# Patient Record
Sex: FEMALE | Race: WHITE | NOT HISPANIC OR LATINO | URBAN - METROPOLITAN AREA
[De-identification: names, ages, dates, MRNs, and addresses within clinical notes are randomized per-mention and may not be internally consistent; named-entity substitution may affect disease eponyms.]

---

## 2023-04-17 ENCOUNTER — LAB (OUTPATIENT)
Dept: LAB | Facility: LAB | Age: 23
End: 2023-04-17
Payer: COMMERCIAL

## 2023-04-17 ENCOUNTER — OFFICE VISIT (OUTPATIENT)
Dept: PRIMARY CARE | Facility: CLINIC | Age: 23
End: 2023-04-17
Payer: COMMERCIAL

## 2023-04-17 VITALS
WEIGHT: 164 LBS | HEART RATE: 74 BPM | BODY MASS INDEX: 24.86 KG/M2 | DIASTOLIC BLOOD PRESSURE: 88 MMHG | SYSTOLIC BLOOD PRESSURE: 127 MMHG | TEMPERATURE: 97.8 F | OXYGEN SATURATION: 97 % | HEIGHT: 68 IN

## 2023-04-17 DIAGNOSIS — F90.0 ATTENTION DEFICIT HYPERACTIVITY DISORDER (ADHD), PREDOMINANTLY INATTENTIVE TYPE: ICD-10-CM

## 2023-04-17 DIAGNOSIS — G44.209 TENSION HEADACHE: ICD-10-CM

## 2023-04-17 DIAGNOSIS — N92.6 IRREGULAR PERIODS/MENSTRUAL CYCLES: ICD-10-CM

## 2023-04-17 DIAGNOSIS — G43.109 MIGRAINE WITH AURA AND WITHOUT STATUS MIGRAINOSUS, NOT INTRACTABLE: ICD-10-CM

## 2023-04-17 DIAGNOSIS — F41.8 DEPRESSION WITH ANXIETY: Primary | ICD-10-CM

## 2023-04-17 DIAGNOSIS — R41.840 LACK OF CONCENTRATION: ICD-10-CM

## 2023-04-17 PROBLEM — G44.029 CHRONIC CLUSTER HEADACHE, NOT INTRACTABLE: Status: ACTIVE | Noted: 2018-07-11

## 2023-04-17 PROBLEM — H52.03 HYPERMETROPIA OF BOTH EYES: Status: ACTIVE | Noted: 2018-07-11

## 2023-04-17 PROBLEM — L70.9 ACNE: Status: ACTIVE | Noted: 2023-04-17

## 2023-04-17 PROBLEM — R53.83 FATIGUE: Status: ACTIVE | Noted: 2023-04-17

## 2023-04-17 PROBLEM — R55 PRE-SYNCOPE: Status: RESOLVED | Noted: 2023-04-17 | Resolved: 2023-04-17

## 2023-04-17 PROBLEM — L70.9 ACNE: Status: RESOLVED | Noted: 2023-04-17 | Resolved: 2023-04-17

## 2023-04-17 PROBLEM — G93.40 ENCEPHALOPATHY: Status: ACTIVE | Noted: 2023-04-17

## 2023-04-17 PROBLEM — B37.31 VAGINAL CANDIDIASIS: Status: ACTIVE | Noted: 2023-04-17

## 2023-04-17 PROBLEM — R56.9 OBSERVED SEIZURE-LIKE ACTIVITY (MULTI): Status: RESOLVED | Noted: 2023-04-17 | Resolved: 2023-04-17

## 2023-04-17 PROBLEM — R42 DIZZINESS: Status: ACTIVE | Noted: 2023-04-17

## 2023-04-17 PROBLEM — F41.9 ANXIETY: Status: ACTIVE | Noted: 2023-04-17

## 2023-04-17 PROBLEM — R55 PRE-SYNCOPE: Status: ACTIVE | Noted: 2023-04-17

## 2023-04-17 PROBLEM — R79.89 ABNORMAL THYROID BLOOD TEST: Status: ACTIVE | Noted: 2023-04-17

## 2023-04-17 PROBLEM — R53.83 FATIGUE: Status: RESOLVED | Noted: 2023-04-17 | Resolved: 2023-04-17

## 2023-04-17 PROBLEM — R53.1 GENERALIZED WEAKNESS: Status: ACTIVE | Noted: 2023-04-17

## 2023-04-17 PROBLEM — R42 DIZZINESS: Status: RESOLVED | Noted: 2023-04-17 | Resolved: 2023-04-17

## 2023-04-17 PROBLEM — R53.1 GENERALIZED WEAKNESS: Status: RESOLVED | Noted: 2023-04-17 | Resolved: 2023-04-17

## 2023-04-17 PROBLEM — R56.9 OBSERVED SEIZURE-LIKE ACTIVITY (MULTI): Status: ACTIVE | Noted: 2023-04-17

## 2023-04-17 PROBLEM — B37.31 VAGINAL CANDIDIASIS: Status: RESOLVED | Noted: 2023-04-17 | Resolved: 2023-04-17

## 2023-04-17 LAB
BASOPHILS (10*3/UL) IN BLOOD BY AUTOMATED COUNT: 0.05 X10E9/L (ref 0–0.1)
BASOPHILS/100 LEUKOCYTES IN BLOOD BY AUTOMATED COUNT: 0.7 % (ref 0–2)
EOSINOPHILS (10*3/UL) IN BLOOD BY AUTOMATED COUNT: 0.11 X10E9/L (ref 0–0.7)
EOSINOPHILS/100 LEUKOCYTES IN BLOOD BY AUTOMATED COUNT: 1.4 % (ref 0–6)
ERYTHROCYTE DISTRIBUTION WIDTH (RATIO) BY AUTOMATED COUNT: 12.2 % (ref 11.5–14.5)
ERYTHROCYTE MEAN CORPUSCULAR HEMOGLOBIN CONCENTRATION (G/DL) BY AUTOMATED: 32.7 G/DL (ref 32–36)
ERYTHROCYTE MEAN CORPUSCULAR VOLUME (FL) BY AUTOMATED COUNT: 94 FL (ref 80–100)
ERYTHROCYTES (10*6/UL) IN BLOOD BY AUTOMATED COUNT: 4.47 X10E12/L (ref 4–5.2)
FOLLITROPIN (IU/L) IN SER/PLAS: 5.7 IU/L
HEMATOCRIT (%) IN BLOOD BY AUTOMATED COUNT: 41.9 % (ref 36–46)
HEMOGLOBIN (G/DL) IN BLOOD: 13.7 G/DL (ref 12–16)
IMMATURE GRANULOCYTES/100 LEUKOCYTES IN BLOOD BY AUTOMATED COUNT: 0.1 % (ref 0–0.9)
IRON (UG/DL) IN SER/PLAS: 97 UG/DL (ref 35–150)
IRON BINDING CAPACITY (UG/DL) IN SER/PLAS: 395 UG/DL (ref 240–445)
IRON SATURATION (%) IN SER/PLAS: 25 % (ref 25–45)
LEUKOCYTES (10*3/UL) IN BLOOD BY AUTOMATED COUNT: 7.7 X10E9/L (ref 4.4–11.3)
LUTEINIZING HORMONE (IU/ML) IN SER/PLAS: 5.5 IU/L
LYMPHOCYTES (10*3/UL) IN BLOOD BY AUTOMATED COUNT: 2.83 X10E9/L (ref 1.2–4.8)
LYMPHOCYTES/100 LEUKOCYTES IN BLOOD BY AUTOMATED COUNT: 37 % (ref 13–44)
MONOCYTES (10*3/UL) IN BLOOD BY AUTOMATED COUNT: 0.29 X10E9/L (ref 0.1–1)
MONOCYTES/100 LEUKOCYTES IN BLOOD BY AUTOMATED COUNT: 3.8 % (ref 2–10)
NEUTROPHILS (10*3/UL) IN BLOOD BY AUTOMATED COUNT: 4.36 X10E9/L (ref 1.2–7.7)
NEUTROPHILS/100 LEUKOCYTES IN BLOOD BY AUTOMATED COUNT: 57 % (ref 40–80)
PLATELETS (10*3/UL) IN BLOOD AUTOMATED COUNT: 248 X10E9/L (ref 150–450)
THYROTROPIN (MIU/L) IN SER/PLAS BY DETECTION LIMIT <= 0.05 MIU/L: 4 MIU/L (ref 0.44–3.98)
THYROXINE (T4) FREE (NG/DL) IN SER/PLAS: 0.7 NG/DL (ref 0.61–1.12)

## 2023-04-17 PROCEDURE — 36415 COLL VENOUS BLD VENIPUNCTURE: CPT

## 2023-04-17 PROCEDURE — 99214 OFFICE O/P EST MOD 30 MIN: CPT | Performed by: INTERNAL MEDICINE

## 2023-04-17 PROCEDURE — 85025 COMPLETE CBC W/AUTO DIFF WBC: CPT

## 2023-04-17 PROCEDURE — 83550 IRON BINDING TEST: CPT

## 2023-04-17 PROCEDURE — 84439 ASSAY OF FREE THYROXINE: CPT

## 2023-04-17 PROCEDURE — 1036F TOBACCO NON-USER: CPT | Performed by: INTERNAL MEDICINE

## 2023-04-17 PROCEDURE — 83002 ASSAY OF GONADOTROPIN (LH): CPT

## 2023-04-17 PROCEDURE — 83001 ASSAY OF GONADOTROPIN (FSH): CPT

## 2023-04-17 PROCEDURE — 84443 ASSAY THYROID STIM HORMONE: CPT

## 2023-04-17 PROCEDURE — 83540 ASSAY OF IRON: CPT

## 2023-04-17 RX ORDER — RIZATRIPTAN BENZOATE 5 MG/1
1 TABLET ORAL DAILY PRN
COMMUNITY
Start: 2023-03-31 | End: 2023-12-22 | Stop reason: SDUPTHER

## 2023-04-17 RX ORDER — AMPHETAMINE 10 MG/1
1 TABLET, EXTENDED RELEASE ORAL
COMMUNITY
Start: 2023-02-13 | End: 2023-04-17 | Stop reason: ALTCHOICE

## 2023-04-17 RX ORDER — SERTRALINE HYDROCHLORIDE 100 MG/1
150 TABLET, FILM COATED ORAL DAILY
COMMUNITY
End: 2023-09-11 | Stop reason: DRUGHIGH

## 2023-04-17 RX ORDER — PROPRANOLOL HYDROCHLORIDE 20 MG/1
10 TABLET ORAL 2 TIMES DAILY
Qty: 30 TABLET | Refills: 5 | Status: SHIPPED | OUTPATIENT
Start: 2023-04-17 | End: 2024-01-09 | Stop reason: SDUPTHER

## 2023-04-17 RX ORDER — AMPHETAMINE 15 MG/1
TABLET, EXTENDED RELEASE ORAL
COMMUNITY
Start: 2023-03-02 | End: 2023-10-03 | Stop reason: ALTCHOICE

## 2023-04-17 RX ORDER — DROSPIRENONE AND ETHINYL ESTRADIOL 0.03MG-3MG
1 KIT ORAL DAILY
COMMUNITY
Start: 2022-10-03 | End: 2023-05-25 | Stop reason: SDUPTHER

## 2023-04-17 RX ORDER — SERTRALINE HYDROCHLORIDE 50 MG/1
1 TABLET, FILM COATED ORAL DAILY
COMMUNITY
Start: 2022-09-03 | End: 2023-09-11 | Stop reason: WASHOUT

## 2023-04-17 ASSESSMENT — PATIENT HEALTH QUESTIONNAIRE - PHQ9
SUM OF ALL RESPONSES TO PHQ9 QUESTIONS 1 AND 2: 0
1. LITTLE INTEREST OR PLEASURE IN DOING THINGS: NOT AT ALL
2. FEELING DOWN, DEPRESSED OR HOPELESS: NOT AT ALL

## 2023-04-17 NOTE — PATIENT INSTRUCTIONS
Order TSH,FSH CBC to evaluate her irregular periods.  Return for PAP.  Start B Blocker to help prevent migraine headache,that will also help her anxiety.  I reviewed her labs on her phone done in [place],had normal CBC,CMP,lipid,TSH.  Keep a log for headache,cycles and spotting.  Also discuss your headache with your neurologist Dr Deal.

## 2023-04-17 NOTE — PROGRESS NOTES
"Subjective   Patient ID: Steph Foote is a 22 y.o. female who presents for Follow up on medication .    Here to discuss her migraine,having 6-8 per month,usually respond to Maxalt 1 tablet PRN,has aura,no tingling,numbness,N/V.  She also has had heavy periods and spotting especially over last 3-4 months. if she was exercising hard or under stress.  She continue to see Dr Deal for her ADHD and anxiety and her Sertraline was increased up to 150 mg daily.  Her OC was changed to a different generic.  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  She stopped exercising hard and cycling, now on her feet all day at [employer].  She denies any seizure.  She had physical and complete lab in [place] last fall.         Review of Systems   Reason unable to perform ROS: as HPI.       Objective   /88 (BP Location: Right arm, Patient Position: Sitting, BP Cuff Size: Adult)   Pulse 74   Temp 36.6 °C (97.8 °F) (Temporal)   Ht 1.727 m (5' 8\")   Wt 74.4 kg (164 lb)   SpO2 97%   BMI 24.94 kg/m²     Physical Exam  Constitutional:       Appearance: Normal appearance.   HENT:      Head: Normocephalic and atraumatic.   Eyes:      Extraocular Movements: Extraocular movements intact.      Pupils: Pupils are equal, round, and reactive to light.   Cardiovascular:      Rate and Rhythm: Normal rate and regular rhythm.      Heart sounds: Normal heart sounds.   Pulmonary:      Effort: Pulmonary effort is normal.      Breath sounds: Normal breath sounds. No wheezing or rhonchi.   Abdominal:      General: There is no distension.   Musculoskeletal:         General: Normal range of motion.      Cervical back: Normal range of motion and neck supple.      Left lower leg: No edema.   Skin:     General: Skin is warm.   Neurological:      General: No focal deficit present.      Mental Status: She is alert and oriented to person, place, and time.   Psychiatric:         " Mood and Affect: Mood normal.         Behavior: Behavior normal.         Assessment/Plan   Problem List Items Addressed This Visit          Nervous    Tension headache       Genitourinary    Irregular periods/menstrual cycles    Relevant Orders    TSH with reflex to Free T4 if abnormal    CBC and Auto Differential    Iron and TIBC    FSH & LH       Other    Attention deficit hyperactivity disorder (ADHD), predominantly inattentive type    Depression with anxiety - Primary    Migraine with aura and without status migrainosus, not intractable    Relevant Medications    propranolol (Inderal) 20 mg tablet    Lack of concentration

## 2023-04-18 DIAGNOSIS — E03.8 SUBCLINICAL HYPOTHYROIDISM: Primary | ICD-10-CM

## 2023-04-18 NOTE — RESULT ENCOUNTER NOTE
TSH slightly above normal but T4 is normal,we will recheck thyroid test in 2 months. Rest of lab result within normal range.

## 2023-05-25 DIAGNOSIS — Z00.00 HEALTHCARE MAINTENANCE: ICD-10-CM

## 2023-05-25 DIAGNOSIS — L70.9 ACNE, UNSPECIFIED ACNE TYPE: ICD-10-CM

## 2023-05-25 RX ORDER — DROSPIRENONE AND ETHINYL ESTRADIOL 0.03MG-3MG
1 KIT ORAL DAILY
Qty: 84 TABLET | Refills: 3 | Status: SHIPPED | OUTPATIENT
Start: 2023-05-25 | End: 2024-05-31 | Stop reason: SDUPTHER

## 2023-06-22 PROBLEM — R53.83 FATIGUE: Status: ACTIVE | Noted: 2023-06-22

## 2023-06-22 PROBLEM — R42 DIZZINESS: Status: ACTIVE | Noted: 2023-06-22

## 2023-06-22 PROBLEM — B37.31 VAGINAL CANDIDIASIS: Status: ACTIVE | Noted: 2023-06-22

## 2023-06-22 PROBLEM — L70.9 ACNE: Status: ACTIVE | Noted: 2023-06-22

## 2023-06-22 PROBLEM — R53.1 GENERALIZED WEAKNESS: Status: ACTIVE | Noted: 2023-06-22

## 2023-06-22 PROBLEM — R56.9 OBSERVED SEIZURE-LIKE ACTIVITY (MULTI): Status: ACTIVE | Noted: 2023-06-22

## 2023-06-22 PROBLEM — R55 PRE-SYNCOPE: Status: ACTIVE | Noted: 2023-06-22

## 2023-06-22 PROBLEM — G93.40 ENCEPHALOPATHY: Status: ACTIVE | Noted: 2023-06-22

## 2023-09-11 ENCOUNTER — OFFICE VISIT (OUTPATIENT)
Dept: PRIMARY CARE | Facility: CLINIC | Age: 23
End: 2023-09-11
Payer: COMMERCIAL

## 2023-09-11 ENCOUNTER — LAB (OUTPATIENT)
Dept: LAB | Facility: LAB | Age: 23
End: 2023-09-11
Payer: COMMERCIAL

## 2023-09-11 VITALS
TEMPERATURE: 97.1 F | WEIGHT: 173 LBS | RESPIRATION RATE: 17 BRPM | OXYGEN SATURATION: 97 % | DIASTOLIC BLOOD PRESSURE: 67 MMHG | SYSTOLIC BLOOD PRESSURE: 108 MMHG | HEART RATE: 67 BPM | BODY MASS INDEX: 26.3 KG/M2

## 2023-09-11 DIAGNOSIS — G44.029 CHRONIC CLUSTER HEADACHE, NOT INTRACTABLE: ICD-10-CM

## 2023-09-11 DIAGNOSIS — R23.3 EASY BRUISING: ICD-10-CM

## 2023-09-11 DIAGNOSIS — R53.83 OTHER FATIGUE: ICD-10-CM

## 2023-09-11 DIAGNOSIS — R79.89 ABNORMAL THYROID BLOOD TEST: Primary | ICD-10-CM

## 2023-09-11 DIAGNOSIS — E03.8 SUBCLINICAL HYPOTHYROIDISM: ICD-10-CM

## 2023-09-11 DIAGNOSIS — G43.109 MIGRAINE WITH AURA AND WITHOUT STATUS MIGRAINOSUS, NOT INTRACTABLE: ICD-10-CM

## 2023-09-11 PROBLEM — R56.9 OBSERVED SEIZURE-LIKE ACTIVITY (MULTI): Status: RESOLVED | Noted: 2023-06-22 | Resolved: 2023-09-11

## 2023-09-11 LAB
ALANINE AMINOTRANSFERASE (SGPT) (U/L) IN SER/PLAS: 14 U/L (ref 7–45)
ALBUMIN (G/DL) IN SER/PLAS: 4.3 G/DL (ref 3.4–5)
ALKALINE PHOSPHATASE (U/L) IN SER/PLAS: 58 U/L (ref 33–110)
ANION GAP IN SER/PLAS: 11 MMOL/L (ref 10–20)
ASPARTATE AMINOTRANSFERASE (SGOT) (U/L) IN SER/PLAS: 18 U/L (ref 9–39)
BASOPHILS (10*3/UL) IN BLOOD BY AUTOMATED COUNT: 0.05 X10E9/L (ref 0–0.1)
BASOPHILS/100 LEUKOCYTES IN BLOOD BY AUTOMATED COUNT: 0.7 % (ref 0–2)
BILIRUBIN TOTAL (MG/DL) IN SER/PLAS: 0.4 MG/DL (ref 0–1.2)
CALCIUM (MG/DL) IN SER/PLAS: 9.4 MG/DL (ref 8.6–10.3)
CARBON DIOXIDE, TOTAL (MMOL/L) IN SER/PLAS: 29 MMOL/L (ref 21–32)
CHLORIDE (MMOL/L) IN SER/PLAS: 104 MMOL/L (ref 98–107)
CREATININE (MG/DL) IN SER/PLAS: 0.73 MG/DL (ref 0.5–1.05)
EOSINOPHILS (10*3/UL) IN BLOOD BY AUTOMATED COUNT: 0.15 X10E9/L (ref 0–0.7)
EOSINOPHILS/100 LEUKOCYTES IN BLOOD BY AUTOMATED COUNT: 2 % (ref 0–6)
ERYTHROCYTE DISTRIBUTION WIDTH (RATIO) BY AUTOMATED COUNT: 12.7 % (ref 11.5–14.5)
ERYTHROCYTE MEAN CORPUSCULAR HEMOGLOBIN CONCENTRATION (G/DL) BY AUTOMATED: 31.6 G/DL (ref 32–36)
ERYTHROCYTE MEAN CORPUSCULAR VOLUME (FL) BY AUTOMATED COUNT: 96 FL (ref 80–100)
ERYTHROCYTES (10*6/UL) IN BLOOD BY AUTOMATED COUNT: 3.99 X10E12/L (ref 4–5.2)
GFR FEMALE: >90 ML/MIN/1.73M2
GLUCOSE (MG/DL) IN SER/PLAS: 63 MG/DL (ref 74–99)
HEMATOCRIT (%) IN BLOOD BY AUTOMATED COUNT: 38.3 % (ref 36–46)
HEMOGLOBIN (G/DL) IN BLOOD: 12.1 G/DL (ref 12–16)
IMMATURE GRANULOCYTES/100 LEUKOCYTES IN BLOOD BY AUTOMATED COUNT: 0.3 % (ref 0–0.9)
LEUKOCYTES (10*3/UL) IN BLOOD BY AUTOMATED COUNT: 7.7 X10E9/L (ref 4.4–11.3)
LYMPHOCYTES (10*3/UL) IN BLOOD BY AUTOMATED COUNT: 3.28 X10E9/L (ref 1.2–4.8)
LYMPHOCYTES/100 LEUKOCYTES IN BLOOD BY AUTOMATED COUNT: 42.7 % (ref 13–44)
MONOCYTES (10*3/UL) IN BLOOD BY AUTOMATED COUNT: 0.39 X10E9/L (ref 0.1–1)
MONOCYTES/100 LEUKOCYTES IN BLOOD BY AUTOMATED COUNT: 5.1 % (ref 2–10)
NEUTROPHILS (10*3/UL) IN BLOOD BY AUTOMATED COUNT: 3.8 X10E9/L (ref 1.2–7.7)
NEUTROPHILS/100 LEUKOCYTES IN BLOOD BY AUTOMATED COUNT: 49.2 % (ref 40–80)
PLATELETS (10*3/UL) IN BLOOD AUTOMATED COUNT: 263 X10E9/L (ref 150–450)
POTASSIUM (MMOL/L) IN SER/PLAS: 4 MMOL/L (ref 3.5–5.3)
PROTEIN TOTAL: 7 G/DL (ref 6.4–8.2)
SODIUM (MMOL/L) IN SER/PLAS: 140 MMOL/L (ref 136–145)
THYROTROPIN (MIU/L) IN SER/PLAS BY DETECTION LIMIT <= 0.05 MIU/L: 2.8 MIU/L (ref 0.44–3.98)
THYROXINE (T4) FREE (NG/DL) IN SER/PLAS: 0.72 NG/DL (ref 0.61–1.12)
UREA NITROGEN (MG/DL) IN SER/PLAS: 13 MG/DL (ref 6–23)

## 2023-09-11 PROCEDURE — 84443 ASSAY THYROID STIM HORMONE: CPT

## 2023-09-11 PROCEDURE — 36415 COLL VENOUS BLD VENIPUNCTURE: CPT

## 2023-09-11 PROCEDURE — 99214 OFFICE O/P EST MOD 30 MIN: CPT | Performed by: INTERNAL MEDICINE

## 2023-09-11 PROCEDURE — 84439 ASSAY OF FREE THYROXINE: CPT

## 2023-09-11 PROCEDURE — 1036F TOBACCO NON-USER: CPT | Performed by: INTERNAL MEDICINE

## 2023-09-11 PROCEDURE — 80053 COMPREHEN METABOLIC PANEL: CPT

## 2023-09-11 PROCEDURE — 86376 MICROSOMAL ANTIBODY EACH: CPT

## 2023-09-11 PROCEDURE — 85025 COMPLETE CBC W/AUTO DIFF WBC: CPT

## 2023-09-11 PROCEDURE — 84480 ASSAY TRIIODOTHYRONINE (T3): CPT

## 2023-09-11 RX ORDER — SERTRALINE HYDROCHLORIDE 100 MG/1
TABLET, FILM COATED ORAL
Qty: 90 TABLET | Refills: 3 | Status: SHIPPED
Start: 2023-09-11 | End: 2023-10-03 | Stop reason: SDUPTHER

## 2023-09-11 ASSESSMENT — COLUMBIA-SUICIDE SEVERITY RATING SCALE - C-SSRS
2. HAVE YOU ACTUALLY HAD ANY THOUGHTS OF KILLING YOURSELF?: NO
1. IN THE PAST MONTH, HAVE YOU WISHED YOU WERE DEAD OR WISHED YOU COULD GO TO SLEEP AND NOT WAKE UP?: NO
6. HAVE YOU EVER DONE ANYTHING, STARTED TO DO ANYTHING, OR PREPARED TO DO ANYTHING TO END YOUR LIFE?: NO

## 2023-09-11 ASSESSMENT — ENCOUNTER SYMPTOMS
ROS SKIN COMMENTS: AS HPI.
GASTROINTESTINAL NEGATIVE: 1
RESPIRATORY NEGATIVE: 1
DEPRESSION: 0
NEUROLOGICAL NEGATIVE: 1
CONSTITUTIONAL NEGATIVE: 1
PSYCHIATRIC NEGATIVE: 1
EYES NEGATIVE: 1
HEMATOLOGIC/LYMPHATIC NEGATIVE: 1
CARDIOVASCULAR NEGATIVE: 1
OCCASIONAL FEELINGS OF UNSTEADINESS: 0
LOSS OF SENSATION IN FEET: 0

## 2023-09-11 ASSESSMENT — PAIN SCALES - GENERAL: PAINLEVEL: 0-NO PAIN

## 2023-09-11 ASSESSMENT — PATIENT HEALTH QUESTIONNAIRE - PHQ9
SUM OF ALL RESPONSES TO PHQ9 QUESTIONS 1 AND 2: 0
2. FEELING DOWN, DEPRESSED OR HOPELESS: NOT AT ALL
1. LITTLE INTEREST OR PLEASURE IN DOING THINGS: NOT AT ALL

## 2023-09-11 NOTE — ASSESSMENT & PLAN NOTE
Check CBC with dif and CMP.  No recurrence of her bruising,it could have been due to NSAID intake at that time.

## 2023-09-11 NOTE — PROGRESS NOTES
Subjective   Patient ID: Steph Foote is a 22 y.o. female who presents for Follow-up (The patient is here for a follow up. ).    Here for follow up.  She c/o bruising upper legs,lasted 2 weeks,non painful,no trauma but was taking Ibuprofen at that time,no recurrence of her bruising.  She noted  some itchy feet while she was in [place],no recurrence.  She has been working at [employer],cardiology department.  She continue to see her neurologist for anxiety ,headache and ADHD.  Follow up on subclinical hypothyroid,she has some fatigue.         Review of Systems   Constitutional: Negative.    HENT: Negative.     Eyes: Negative.    Respiratory: Negative.     Cardiovascular: Negative.    Gastrointestinal: Negative.    Genitourinary: Negative.    Skin:         As HPI.   Neurological: Negative.    Hematological: Negative.         As HPI.   Psychiatric/Behavioral: Negative.         Objective   /67 (BP Location: Left arm, Patient Position: Sitting)   Pulse 67   Temp 36.2 °C (97.1 °F)   Resp 17   Wt 78.5 kg (173 lb)   SpO2 97%   BMI 26.30 kg/m²     Physical Exam  Constitutional:       Appearance: Normal appearance.   HENT:      Head: Normocephalic and atraumatic.   Eyes:      Extraocular Movements: Extraocular movements intact.      Pupils: Pupils are equal, round, and reactive to light.   Cardiovascular:      Rate and Rhythm: Normal rate and regular rhythm.      Heart sounds: Normal heart sounds.   Pulmonary:      Effort: Pulmonary effort is normal.      Breath sounds: Normal breath sounds. No wheezing or rhonchi.   Abdominal:      General: Abdomen is flat. Bowel sounds are normal. There is no distension.      Palpations: Abdomen is soft.   Musculoskeletal:         General: Normal range of motion.      Cervical back: Normal range of motion and neck supple.      Right lower leg: No edema.      Left lower leg: No edema.   Lymphadenopathy:      Cervical: No cervical adenopathy.   Skin:     General: Skin is warm.    Neurological:      General: No focal deficit present.      Mental Status: She is alert and oriented to person, place, and time.   Psychiatric:         Mood and Affect: Mood normal.         Behavior: Behavior normal.         Assessment/Plan   Problem List Items Addressed This Visit       Abnormal thyroid blood test - Primary     Recheck complete thyroid profile.         Chronic cluster headache, not intractable     Managed by neuro,stable.         Migraine with aura and without status migrainosus, not intractable    Fatigue    Easy bruising     Check CBC with dif and CMP.  No recurrence of her bruising,it could have been due to NSAID intake at that time.         Relevant Orders    CBC and Auto Differential    Comprehensive Metabolic Panel

## 2023-09-12 LAB
THYROPEROXIDASE AB (IU/ML) IN SER/PLAS: >1000 IU/ML
TRIIODOTHYRONINE (T3) (NG/DL) IN SER/PLAS: 136 NG/DL (ref 60–200)

## 2023-09-12 NOTE — RESULT ENCOUNTER NOTE
Lab show elevated TPO,which is most likely dye to Hashimoto thyroid disease,auto immune disease,rest of your labs including thyroid profile are normal.  Follow up with me in 3 months.

## 2023-09-27 ENCOUNTER — TELEMEDICINE (OUTPATIENT)
Dept: PRIMARY CARE | Facility: CLINIC | Age: 23
End: 2023-09-27
Payer: COMMERCIAL

## 2023-09-27 DIAGNOSIS — E04.1 THYROID NODULE: Primary | ICD-10-CM

## 2023-09-27 DIAGNOSIS — E06.3 HASHIMOTO'S THYROIDITIS: ICD-10-CM

## 2023-09-27 PROCEDURE — 99213 OFFICE O/P EST LOW 20 MIN: CPT | Performed by: INTERNAL MEDICINE

## 2023-09-27 ASSESSMENT — ENCOUNTER SYMPTOMS
EYES NEGATIVE: 1
CARDIOVASCULAR NEGATIVE: 1
RESPIRATORY NEGATIVE: 1
HEMATOLOGIC/LYMPHATIC NEGATIVE: 1
FATIGUE: 1
PSYCHIATRIC NEGATIVE: 1
GASTROINTESTINAL NEGATIVE: 1
NEUROLOGICAL NEGATIVE: 1

## 2023-09-27 ASSESSMENT — PATIENT HEALTH QUESTIONNAIRE - PHQ9
1. LITTLE INTEREST OR PLEASURE IN DOING THINGS: NOT AT ALL
2. FEELING DOWN, DEPRESSED OR HOPELESS: NOT AT ALL
SUM OF ALL RESPONSES TO PHQ9 QUESTIONS 1 AND 2: 0

## 2023-09-27 NOTE — PROGRESS NOTES
Subjective   Patient ID: Steph Foote is a 22 y.o. female who presents for Review US results .    An interactive audio and video telecommunication system with patient real time communications between the patient (at the original site) and provider (at the distant site) was utilized to provide this telehealth service.  Verbal consent was requested and obtained from the patient at this date for a telehealth.  Pt to discuss her thyroid ultrasound and elevated TPO lab test.  She does complaint of fatigue,her mother has hypothyroid.         Review of Systems   Constitutional:  Positive for fatigue.   HENT: Negative.     Eyes: Negative.    Respiratory: Negative.     Cardiovascular: Negative.    Gastrointestinal: Negative.    Genitourinary: Negative.    Neurological: Negative.    Hematological: Negative.    Psychiatric/Behavioral: Negative.         Objective   There were no vitals taken for this visit.    Physical Exam  Constitutional:       General: She is not in acute distress.  Cardiovascular:      Rate and Rhythm: Normal rate.   Pulmonary:      Effort: No respiratory distress.   Neurological:      Mental Status: She is alert.         Assessment/Plan   Problem List Items Addressed This Visit             ICD-10-CM    Thyroid nodule - Primary E04.1    Relevant Orders    Referral to Endocrinology    Hashimoto's thyroiditis E06.3    Relevant Orders    Referral to Endocrinology

## 2023-09-29 ENCOUNTER — TELEPHONE (OUTPATIENT)
Dept: PRIMARY CARE | Facility: CLINIC | Age: 23
End: 2023-09-29
Payer: COMMERCIAL

## 2023-10-03 ENCOUNTER — OFFICE VISIT (OUTPATIENT)
Dept: NEUROLOGY | Facility: CLINIC | Age: 23
End: 2023-10-03
Payer: COMMERCIAL

## 2023-10-03 VITALS
HEIGHT: 68 IN | HEART RATE: 72 BPM | BODY MASS INDEX: 26.31 KG/M2 | WEIGHT: 173.6 LBS | DIASTOLIC BLOOD PRESSURE: 79 MMHG | SYSTOLIC BLOOD PRESSURE: 117 MMHG

## 2023-10-03 DIAGNOSIS — F41.9 ANXIETY: Primary | ICD-10-CM

## 2023-10-03 DIAGNOSIS — F41.8 DEPRESSION WITH ANXIETY: ICD-10-CM

## 2023-10-03 DIAGNOSIS — F90.9 ATTENTION DEFICIT HYPERACTIVITY DISORDER (ADHD), UNSPECIFIED ADHD TYPE: ICD-10-CM

## 2023-10-03 DIAGNOSIS — F90.0 ATTENTION DEFICIT HYPERACTIVITY DISORDER (ADHD), PREDOMINANTLY INATTENTIVE TYPE: ICD-10-CM

## 2023-10-03 LAB
AMPHETAMINES UR QL SCN: ABNORMAL
BARBITURATES UR QL SCN: ABNORMAL
BENZODIAZ UR QL SCN: ABNORMAL
BZE UR QL SCN: ABNORMAL
CANNABINOIDS UR QL SCN: ABNORMAL
FENTANYL+NORFENTANYL UR QL SCN: ABNORMAL
OPIATES UR QL SCN: ABNORMAL
OXYCODONE+OXYMORPHONE UR QL SCN: ABNORMAL
PCP UR QL SCN: ABNORMAL

## 2023-10-03 PROCEDURE — 99213 OFFICE O/P EST LOW 20 MIN: CPT

## 2023-10-03 PROCEDURE — 1036F TOBACCO NON-USER: CPT

## 2023-10-03 RX ORDER — DEXTROAMPHETAMINE SACCHARATE, AMPHETAMINE ASPARTATE, DEXTROAMPHETAMINE SULFATE AND AMPHETAMINE SULFATE 5; 5; 5; 5 MG/1; MG/1; MG/1; MG/1
20 TABLET ORAL DAILY
Qty: 30 TABLET | Refills: 0 | Status: SHIPPED | OUTPATIENT
Start: 2023-12-02 | End: 2023-10-05

## 2023-10-03 RX ORDER — DEXTROAMPHETAMINE SACCHARATE, AMPHETAMINE ASPARTATE, DEXTROAMPHETAMINE SULFATE AND AMPHETAMINE SULFATE 5; 5; 5; 5 MG/1; MG/1; MG/1; MG/1
20 TABLET ORAL DAILY
Qty: 30 TABLET | Refills: 0 | Status: SHIPPED | OUTPATIENT
Start: 2023-10-03 | End: 2023-10-05

## 2023-10-03 RX ORDER — SERTRALINE HYDROCHLORIDE 100 MG/1
TABLET, FILM COATED ORAL
Qty: 90 TABLET | Refills: 3 | Status: SHIPPED | OUTPATIENT
Start: 2023-10-03 | End: 2024-01-09 | Stop reason: SDUPTHER

## 2023-10-03 RX ORDER — DEXTROAMPHETAMINE SACCHARATE, AMPHETAMINE ASPARTATE, DEXTROAMPHETAMINE SULFATE AND AMPHETAMINE SULFATE 5; 5; 5; 5 MG/1; MG/1; MG/1; MG/1
20 TABLET ORAL DAILY
Qty: 30 TABLET | Refills: 0 | Status: SHIPPED | OUTPATIENT
Start: 2023-11-02 | End: 2023-10-05

## 2023-10-03 ASSESSMENT — PATIENT HEALTH QUESTIONNAIRE - PHQ9
2. FEELING DOWN, DEPRESSED OR HOPELESS: NOT AT ALL
SUM OF ALL RESPONSES TO PHQ9 QUESTIONS 1 & 2: 0
1. LITTLE INTEREST OR PLEASURE IN DOING THINGS: NOT AT ALL

## 2023-10-03 NOTE — PROGRESS NOTES
Subjective   Steph Foote is a 22 y.o.   female.  HPI  The patient is being seen today for their ADHD and anxiety. They are currently taking Dyanavel and it has not been effective. She states that she is having a major crash with this medication. She was doing better on Adderall, but was switched to something else because of the shortage. 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  The patient agrees that their quality of life has improved while taking this medication (Adderall) and she would like to go back on her previous dose. Patient denies any chest pain, heart palpitations, sleep issues, appetite changes, weight loss, and mood changes while taking this medication. She is taking Zoloft for her anxiety and depression and it has been effective. Neurological exam is normal. I have reviewed the medications and the chart.    I have personally reviewed the OARRS report for this patient. I have considered the risks of abuse, dependence, addiction, and diversion. I believe that it is critically appropriate for this patient to be prescribed this medication based on documented diagnosis of ADHD. An updated contract between the patient and myself was signed, scanned into the EMR, and the patient agrees to the terms. Any deviation from this agreement will result in termination from my practice.      UA completed in office.     Review of systems are negative unless otherwise specified in HPI.   Objective   Neurological Exam  Physical Exam  Mental status: Awake & alert, no acute distress. Oriented to person, place, and time. Speech fluent, clear. Naming, repetition, and comprehension intact. Short term memory intact and tested by word recall. Attention intact with naming months of year backwards.  Cardiopulmonary: HR and rhythm regular, normal S1, S2, no murmur auscultated, no bruits heard bilateral carotid arteries. CTAB, no evidence of respiratory distress. Abdomen is soft, non distended, no  "organ megaly. No edema present in extremities, pulses are +2 throughout.   CN: PERRLA, pupils were 3/3mm to 2/2mm, Visual field intact x 4 with finger counting. EOM intact. Facial sensation intact to light touch. No facial asymmetry. Palate elevated symmetrically with \"ahh\". Shoulder shrug symmetric. Tongue protruded midline.   Motor: Normal muscle bulk & tone, strength 5/5 BUE/BLE, no abnormal movement.   Sensory: Intact to light touch, no issue with differentiation. No tactile extinction.  Coordination: Finger to nose without overshoot.  Reflexes: 2+ bilaterally in biceps, triceps, brachialradialis, patella, and achilles.  Gait: normal. Intact with heel walking, toe walking, and tandem gait.         Assessment/Plan     Discussed following up in 3 months. Adderall was sent to pharmacy. Discussed with the patient the purpose of medication, as well as potential side effects to be aware of. Informed the patient about abuse potential of medication and the importance adhering to the controlled substance agreement. Advised patient to call office with any adverse reaction to medication.     The patient has an interest in virtual visits.     "

## 2023-10-05 ENCOUNTER — TELEPHONE (OUTPATIENT)
Dept: NEUROLOGY | Facility: CLINIC | Age: 23
End: 2023-10-05
Payer: COMMERCIAL

## 2023-10-05 RX ORDER — DEXTROAMPHETAMINE SACCHARATE, AMPHETAMINE ASPARTATE, DEXTROAMPHETAMINE SULFATE AND AMPHETAMINE SULFATE 5; 5; 5; 5 MG/1; MG/1; MG/1; MG/1
20 TABLET ORAL DAILY
Qty: 30 TABLET | Refills: 0 | Status: SHIPPED | OUTPATIENT
Start: 2023-12-04 | End: 2023-10-10 | Stop reason: SDUPTHER

## 2023-10-05 RX ORDER — DEXTROAMPHETAMINE SACCHARATE, AMPHETAMINE ASPARTATE, DEXTROAMPHETAMINE SULFATE AND AMPHETAMINE SULFATE 5; 5; 5; 5 MG/1; MG/1; MG/1; MG/1
20 TABLET ORAL DAILY
Qty: 30 TABLET | Refills: 0 | Status: SHIPPED | OUTPATIENT
Start: 2023-11-04 | End: 2023-10-10 | Stop reason: SDUPTHER

## 2023-10-05 RX ORDER — DEXTROAMPHETAMINE SACCHARATE, AMPHETAMINE ASPARTATE, DEXTROAMPHETAMINE SULFATE AND AMPHETAMINE SULFATE 5; 5; 5; 5 MG/1; MG/1; MG/1; MG/1
20 TABLET ORAL DAILY
Qty: 30 TABLET | Refills: 0 | Status: SHIPPED | OUTPATIENT
Start: 2023-10-05 | End: 2023-10-10 | Stop reason: SDUPTHER

## 2023-10-07 LAB
AMPHET UR-MCNC: >5000 NG/ML
MDA UR-MCNC: <200 NG/ML
MDEA UR-MCNC: <200 NG/ML
MDMA UR-MCNC: <200 NG/ML
METHAMPHET UR-MCNC: <200 NG/ML
PHENTERMINE UR CFM-MCNC: <200 NG/ML

## 2023-10-10 ENCOUNTER — TELEPHONE (OUTPATIENT)
Dept: NEUROSURGERY | Facility: CLINIC | Age: 23
End: 2023-10-10
Payer: COMMERCIAL

## 2023-10-10 DIAGNOSIS — F90.9 ATTENTION DEFICIT HYPERACTIVITY DISORDER (ADHD), UNSPECIFIED ADHD TYPE: ICD-10-CM

## 2023-10-10 RX ORDER — DEXTROAMPHETAMINE SACCHARATE, AMPHETAMINE ASPARTATE, DEXTROAMPHETAMINE SULFATE AND AMPHETAMINE SULFATE 5; 5; 5; 5 MG/1; MG/1; MG/1; MG/1
20 TABLET ORAL DAILY
Qty: 30 TABLET | Refills: 0 | Status: SHIPPED | OUTPATIENT
Start: 2023-10-10 | End: 2024-01-04 | Stop reason: WASHOUT

## 2023-10-10 RX ORDER — DEXTROAMPHETAMINE SACCHARATE, AMPHETAMINE ASPARTATE, DEXTROAMPHETAMINE SULFATE AND AMPHETAMINE SULFATE 5; 5; 5; 5 MG/1; MG/1; MG/1; MG/1
20 TABLET ORAL DAILY
Qty: 30 TABLET | Refills: 0 | Status: SHIPPED | OUTPATIENT
Start: 2023-11-04 | End: 2024-01-04 | Stop reason: WASHOUT

## 2023-10-10 RX ORDER — DEXTROAMPHETAMINE SACCHARATE, AMPHETAMINE ASPARTATE, DEXTROAMPHETAMINE SULFATE AND AMPHETAMINE SULFATE 5; 5; 5; 5 MG/1; MG/1; MG/1; MG/1
20 TABLET ORAL DAILY
Qty: 30 TABLET | Refills: 0 | Status: SHIPPED | OUTPATIENT
Start: 2023-12-04 | End: 2024-01-04 | Stop reason: WASHOUT

## 2023-10-10 NOTE — TELEPHONE ENCOUNTER
Pt called stating that after you sent her medication to the pharmacy they no longer had the medication. Can you send the medication to  for her.

## 2023-10-26 ENCOUNTER — LAB (OUTPATIENT)
Dept: LAB | Facility: LAB | Age: 23
End: 2023-10-26
Payer: COMMERCIAL

## 2023-10-26 DIAGNOSIS — E03.8 SUBCLINICAL HYPOTHYROIDISM: ICD-10-CM

## 2023-10-26 LAB — TSH SERPL-ACNC: 1.96 MIU/L (ref 0.44–3.98)

## 2023-10-26 PROCEDURE — 84443 ASSAY THYROID STIM HORMONE: CPT

## 2023-10-26 PROCEDURE — 36415 COLL VENOUS BLD VENIPUNCTURE: CPT

## 2023-12-04 ENCOUNTER — APPOINTMENT (OUTPATIENT)
Dept: ENDOCRINOLOGY | Facility: HOSPITAL | Age: 23
End: 2023-12-04
Payer: COMMERCIAL

## 2023-12-19 ENCOUNTER — APPOINTMENT (OUTPATIENT)
Dept: PRIMARY CARE | Facility: CLINIC | Age: 23
End: 2023-12-19
Payer: COMMERCIAL

## 2023-12-22 DIAGNOSIS — R51.9 NEW ONSET OF HEADACHES: Primary | ICD-10-CM

## 2023-12-22 RX ORDER — RIZATRIPTAN BENZOATE 5 MG/1
5 TABLET ORAL DAILY PRN
Qty: 9 TABLET | Refills: 3 | Status: SHIPPED | OUTPATIENT
Start: 2023-12-22 | End: 2023-12-26 | Stop reason: SDUPTHER

## 2023-12-26 DIAGNOSIS — R51.9 NEW ONSET OF HEADACHES: ICD-10-CM

## 2023-12-26 RX ORDER — RIZATRIPTAN BENZOATE 5 MG/1
5 TABLET ORAL DAILY PRN
Qty: 9 TABLET | Refills: 3 | Status: SHIPPED | OUTPATIENT
Start: 2023-12-26 | End: 2023-12-27 | Stop reason: SDUPTHER

## 2023-12-27 DIAGNOSIS — R51.9 NEW ONSET OF HEADACHES: ICD-10-CM

## 2023-12-27 RX ORDER — RIZATRIPTAN BENZOATE 5 MG/1
5 TABLET ORAL DAILY PRN
Qty: 9 TABLET | Refills: 3 | Status: SHIPPED | OUTPATIENT
Start: 2023-12-27

## 2024-01-02 ENCOUNTER — APPOINTMENT (OUTPATIENT)
Dept: NEUROLOGY | Facility: CLINIC | Age: 24
End: 2024-01-02
Payer: COMMERCIAL

## 2024-01-04 ENCOUNTER — LAB (OUTPATIENT)
Dept: LAB | Facility: LAB | Age: 24
End: 2024-01-04
Payer: COMMERCIAL

## 2024-01-04 ENCOUNTER — OFFICE VISIT (OUTPATIENT)
Dept: ENDOCRINOLOGY | Facility: HOSPITAL | Age: 24
End: 2024-01-04
Payer: COMMERCIAL

## 2024-01-04 VITALS
TEMPERATURE: 98.2 F | SYSTOLIC BLOOD PRESSURE: 119 MMHG | HEIGHT: 68 IN | BODY MASS INDEX: 27.28 KG/M2 | DIASTOLIC BLOOD PRESSURE: 78 MMHG | HEART RATE: 78 BPM | OXYGEN SATURATION: 98 % | WEIGHT: 180 LBS

## 2024-01-04 DIAGNOSIS — E04.1 THYROID NODULE: ICD-10-CM

## 2024-01-04 DIAGNOSIS — E06.3 HASHIMOTO'S THYROIDITIS: ICD-10-CM

## 2024-01-04 LAB
T4 FREE SERPL-MCNC: 0.94 NG/DL (ref 0.78–1.48)
TSH SERPL-ACNC: 1.26 MIU/L (ref 0.44–3.98)

## 2024-01-04 PROCEDURE — 99204 OFFICE O/P NEW MOD 45 MIN: CPT | Performed by: STUDENT IN AN ORGANIZED HEALTH CARE EDUCATION/TRAINING PROGRAM

## 2024-01-04 PROCEDURE — 36415 COLL VENOUS BLD VENIPUNCTURE: CPT

## 2024-01-04 PROCEDURE — 84443 ASSAY THYROID STIM HORMONE: CPT

## 2024-01-04 PROCEDURE — 99214 OFFICE O/P EST MOD 30 MIN: CPT | Performed by: STUDENT IN AN ORGANIZED HEALTH CARE EDUCATION/TRAINING PROGRAM

## 2024-01-04 PROCEDURE — 1036F TOBACCO NON-USER: CPT | Performed by: STUDENT IN AN ORGANIZED HEALTH CARE EDUCATION/TRAINING PROGRAM

## 2024-01-04 PROCEDURE — 84439 ASSAY OF FREE THYROXINE: CPT

## 2024-01-04 ASSESSMENT — ENCOUNTER SYMPTOMS
OCCASIONAL FEELINGS OF UNSTEADINESS: 0
DEPRESSION: 0
LOSS OF SENSATION IN FEET: 0

## 2024-01-04 ASSESSMENT — PATIENT HEALTH QUESTIONNAIRE - PHQ9
SUM OF ALL RESPONSES TO PHQ9 QUESTIONS 1 & 2: 0
2. FEELING DOWN, DEPRESSED OR HOPELESS: NOT AT ALL
1. LITTLE INTEREST OR PLEASURE IN DOING THINGS: NOT AT ALL

## 2024-01-04 ASSESSMENT — LIFESTYLE VARIABLES
HOW MANY STANDARD DRINKS CONTAINING ALCOHOL DO YOU HAVE ON A TYPICAL DAY: PATIENT DOES NOT DRINK
AUDIT-C TOTAL SCORE: 0
HOW OFTEN DO YOU HAVE A DRINK CONTAINING ALCOHOL: NEVER
HOW OFTEN DO YOU HAVE SIX OR MORE DRINKS ON ONE OCCASION: NEVER
SKIP TO QUESTIONS 9-10: 1

## 2024-01-04 ASSESSMENT — PAIN SCALES - GENERAL: PAINLEVEL: 5

## 2024-01-04 NOTE — PROGRESS NOTES
"Subjective   Steph Foote is a 23 y.o. female who I was asked to see in consultation for evaluation of positive antiTPO and an 8 mm nodule.   Patient here with her mother.  She reports symptoms started in [id number] heavy fatigue, always tired if not napping fatigued  Constipation  Dry skin ( feet specifically) scratches all night   Headaches and migraines since 5 years  Gained 13 lbs since May  Symptoms stable  No recent steroids  US thyroid showed 8mm TIRADs 3  Energy: Has to nap at least once. Feels exhausted  Sleep: Wakes up 4:30 to go to work. Sleeps through the night goes to bed 10-11pm  Does not snore  Weight: Gained 13 lbs since May  BM: Constipation was really bad and now goes every few days but a little  Cold or heat intolerance: Alternates between extreme cold and extreme hot  Palpitations or tremors:  Menses or HF or NS: OCP treatment for acne. Irregular periods with OCP affected when exercising more  Skin or hair changes: No increase hair  Cramps: No  Tingling numbness: No  Family history of thyroid cancer: No  History of head or neck radiation: No   Latest Reference Range & Units 04/17/23 08:37 09/11/23 14:45 10/26/23 15:12   Thyroxine, Free 0.61 - 1.12 ng/dL 0.70 0.72    Triiodothyronine 60 - 200 ng/dL  136    LH IU/L 5.5     Thyroid Stimulating Hormone 0.44 - 3.98 mIU/L 4.00 (H) 2.80 1.96   (H): Data is abnormally high  Meds  Propranolol  Rizatriptan  Sertraline 150 mg   Dianavil  OCP (Smita)  Omega 3  Vitamin D  Magnesium  Used to take biotin (In college)  Was taking MAL supplement that had Biotin 2500mcg stopped    Fhx:  Hypothyroidism mother  RA aunt    Review of Systems  all pertinent systems reviewed and are otherwise negative   Objective   Visit Vitals  /78 (BP Location: Right arm, Patient Position: Sitting, BP Cuff Size: Adult)   Pulse 78   Temp 36.8 °C (98.2 °F) (Temporal)   Ht 1.727 m (5' 8\")   Wt 81.6 kg (180 lb)   SpO2 98%   BMI 27.37 kg/m²   Smoking Status Never   BSA 1.98 m²    "   Physical Exam  Constitutional:       General: She is not in acute distress.     Appearance: Normal appearance.   HENT:      Head: Normocephalic and atraumatic.   Eyes:      Extraocular Movements: Extraocular movements intact.      Pupils: Pupils are equal, round, and reactive to light.   Neck:      Comments: Thyroid palpable, no palpable LAD  Cardiovascular:      Rate and Rhythm: Normal rate and regular rhythm.   Pulmonary:      Effort: Pulmonary effort is normal. No respiratory distress.      Breath sounds: Normal breath sounds.   Abdominal:      General: Bowel sounds are normal.      Palpations: Abdomen is soft.      Tenderness: There is no abdominal tenderness.   Musculoskeletal:      Cervical back: Neck supple.   Skin:     Coloration: Skin is not jaundiced or pale.      Findings: No erythema or rash.   Neurological:      General: No focal deficit present.      Mental Status: She is alert and oriented to person, place, and time.      Deep Tendon Reflexes: Reflexes normal.   Psychiatric:         Mood and Affect: Mood normal.         Behavior: Behavior normal.       Lab Review  Lab Results   Component Value Date    TSH 1.96 10/26/2023     Lab Results   Component Value Date    FREET4 0.72 09/11/2023      Latest Reference Range & Units 04/17/23 08:37 09/11/23 14:45 10/26/23 15:12   Thyroxine, Free 0.61 - 1.12 ng/dL 0.70 0.72    Triiodothyronine 60 - 200 ng/dL  136    LH IU/L 5.5     Thyroid Stimulating Hormone 0.44 - 3.98 mIU/L 4.00 (H) 2.80 1.96   (H): Data is abnormally high  Assessment/Plan   Mrs. Foote is a 23 y.o. female who I was asked to see in consultation for evaluation of positive antiTPO and an 8 mm nodule.   Patient here with her mother.  She reports symptoms started in 1622-0632 heavy fatigue, always tired if not napping fatigued  Constipation  Dry skin ( feet specifically) scratches all night   Headaches and migraines since 5 years  Gained 13 lbs since May  Symptoms stable  No recent steroids  US  thyroid showed 8mm TIRADs 3  BM: Constipation was really bad and now goes every few days but a little  Menses or HF or NS: OCP treatment for acne. Irregular periods with OCP affected when exercising more    Clinically euthyroid  Discussed with patient and her mother that if thyroid function is normal less likely her symptoms are related and no need for treatment  Given that she was taking biotin with the last TFTs we will repeat and if TSH elevated we will start treatment     RTC in 1 year

## 2024-01-04 NOTE — PATIENT INSTRUCTIONS
We will start by checking thyroid function. If TSH is on the higher side we will start treatment if not we will monitor    RTC in 1 year

## 2024-01-09 ENCOUNTER — OFFICE VISIT (OUTPATIENT)
Dept: NEUROLOGY | Facility: CLINIC | Age: 24
End: 2024-01-09
Payer: COMMERCIAL

## 2024-01-09 VITALS
SYSTOLIC BLOOD PRESSURE: 135 MMHG | HEART RATE: 71 BPM | HEIGHT: 68 IN | WEIGHT: 179.2 LBS | DIASTOLIC BLOOD PRESSURE: 92 MMHG | BODY MASS INDEX: 27.16 KG/M2

## 2024-01-09 DIAGNOSIS — G93.40 ENCEPHALOPATHY: ICD-10-CM

## 2024-01-09 DIAGNOSIS — G43.109 MIGRAINE WITH AURA AND WITHOUT STATUS MIGRAINOSUS, NOT INTRACTABLE: ICD-10-CM

## 2024-01-09 DIAGNOSIS — Z79.899 MEDICATION MANAGEMENT: ICD-10-CM

## 2024-01-09 DIAGNOSIS — F41.9 ANXIETY: ICD-10-CM

## 2024-01-09 DIAGNOSIS — F90.0 ATTENTION DEFICIT HYPERACTIVITY DISORDER (ADHD), PREDOMINANTLY INATTENTIVE TYPE: Primary | ICD-10-CM

## 2024-01-09 PROCEDURE — 99214 OFFICE O/P EST MOD 30 MIN: CPT

## 2024-01-09 PROCEDURE — 1036F TOBACCO NON-USER: CPT

## 2024-01-09 RX ORDER — SERTRALINE HYDROCHLORIDE 100 MG/1
TABLET, FILM COATED ORAL
Qty: 90 TABLET | Refills: 3 | Status: SHIPPED | OUTPATIENT
Start: 2024-01-09

## 2024-01-09 RX ORDER — DEXTROAMPHETAMINE SACCHARATE, AMPHETAMINE ASPARTATE MONOHYDRATE, DEXTROAMPHETAMINE SULFATE AND AMPHETAMINE SULFATE 5; 5; 5; 5 MG/1; MG/1; MG/1; MG/1
20 CAPSULE, EXTENDED RELEASE ORAL ONCE
Status: DISCONTINUED | OUTPATIENT
Start: 2024-01-09 | End: 2024-03-04

## 2024-01-09 RX ORDER — PROPRANOLOL HYDROCHLORIDE 20 MG/1
10 TABLET ORAL 2 TIMES DAILY
Qty: 30 TABLET | Refills: 5 | Status: SHIPPED | OUTPATIENT
Start: 2024-01-09 | End: 2024-04-01 | Stop reason: SDUPTHER

## 2024-01-09 ASSESSMENT — PATIENT HEALTH QUESTIONNAIRE - PHQ9
2. FEELING DOWN, DEPRESSED OR HOPELESS: NOT AT ALL
1. LITTLE INTEREST OR PLEASURE IN DOING THINGS: NOT AT ALL
SUM OF ALL RESPONSES TO PHQ9 QUESTIONS 1 & 2: 0

## 2024-01-09 NOTE — PROGRESS NOTES
Subjective   Steph Foote is a 23 y.o.   female.  HPI  The patient is being seen today for their encephalopathy r/t their ADHD, anxiety and migraine. They are currently taking Adderal XR 20mg daily and it has been effective. The patient can tell when the medication wears off. The patient agrees that their quality of life has improved while taking this medication. Patient denies any chest pain, heart palpitations, sleep issues, appetite changes, weight loss, and mood changes while taking this medication.  She is taking Zoloft for her anxiety and depression and it has been effective with no side effects. Taking Propanolol and Maxalt for migraine. She is getting between 2-3 migraines per week which are mostly triggered by lack of sleep. Her migraines have been affecting her employment because she is unable to fulfill her work duties due to throbbing head pain, nausea, and vomiting. Neurological exam is normal. I have reviewed the medications and the chart. Review of systems are negative unless otherwise specified in HPI.    Objective   Neurological Exam  Mental Status  Awake, alert and oriented to person, place and time. Speech is normal. Language is fluent with no aphasia. Attention and concentration are normal.    Cranial Nerves  CN III, IV, VI: Pupils equal round and reactive to light bilaterally.  And EOM's Normal.    Motor   Strength is 5/5 throughout all four extremities.    Sensory  Light touch is normal in upper and lower extremities.     Reflexes  Deep tendon reflexes are 2+ and symmetric in all four extremities.    Physical Exam  Eyes:      Pupils: Pupils are equal, round, and reactive to light.   Neurological:      Motor: Motor strength is normal.     Deep Tendon Reflexes: Reflexes are normal and symmetric.   Psychiatric:         Speech: Speech normal.         Assessment/Plan   Discussed following up in 3 months. Medication was sent to pharmacy. Discussed with the patient the purpose of medication, as  well as potential side effects to be aware of. Informed the patient about abuse potential of medication and the importance adhering to the controlled substance agreement. Advised patient to call office with any adverse reaction to medication.   Discussed increasing her Propanolol from 10mg daily to 20mg daily.   Discussed taking the supplement Migravant, which can be purchased OTC and contains B12, Magnesium, Butterbur, CoQ10, and Bioperine. Discussed avoiding triggers that worsen migraine (specific food, lack of sleep, stress, lights, etc.). Keep a migraine diary with frequency, severity, and duration, include other symptoms. Discussed taking abortive medicine as they are getting a migraine and taking preventative medicine daily (if indicated). Avoid taking OTC’s such as Tylenol and Ibuprofen daily, as these can cause rebound headache if stopped. Discussed if abortive therapy is not effective, or? getting > 15 migraines per month-contact neurology for an appointment. The goal of care is to decrease the number and severity of your migraine, medication will help, but does not completely cure you of migraine.?

## 2024-02-28 ENCOUNTER — OFFICE VISIT (OUTPATIENT)
Dept: PRIMARY CARE | Facility: CLINIC | Age: 24
End: 2024-02-28
Payer: COMMERCIAL

## 2024-02-28 VITALS
HEART RATE: 77 BPM | BODY MASS INDEX: 27.22 KG/M2 | SYSTOLIC BLOOD PRESSURE: 110 MMHG | OXYGEN SATURATION: 98 % | HEIGHT: 68 IN | DIASTOLIC BLOOD PRESSURE: 78 MMHG | WEIGHT: 179.6 LBS

## 2024-02-28 DIAGNOSIS — M94.0 COSTOCHONDRITIS: Primary | ICD-10-CM

## 2024-02-28 PROCEDURE — 1036F TOBACCO NON-USER: CPT | Performed by: NURSE PRACTITIONER

## 2024-02-28 PROCEDURE — 99213 OFFICE O/P EST LOW 20 MIN: CPT | Performed by: NURSE PRACTITIONER

## 2024-02-28 RX ORDER — DEXTROAMPHETAMINE SACCHARATE, AMPHETAMINE ASPARTATE MONOHYDRATE, DEXTROAMPHETAMINE SULFATE AND AMPHETAMINE SULFATE 5; 5; 5; 5 MG/1; MG/1; MG/1; MG/1
20 CAPSULE, EXTENDED RELEASE ORAL EVERY MORNING
COMMUNITY
End: 2024-03-04 | Stop reason: SDUPTHER

## 2024-02-28 ASSESSMENT — PATIENT HEALTH QUESTIONNAIRE - PHQ9
1. LITTLE INTEREST OR PLEASURE IN DOING THINGS: SEVERAL DAYS
SUM OF ALL RESPONSES TO PHQ9 QUESTIONS 1 AND 2: 2
10. IF YOU CHECKED OFF ANY PROBLEMS, HOW DIFFICULT HAVE THESE PROBLEMS MADE IT FOR YOU TO DO YOUR WORK, TAKE CARE OF THINGS AT HOME, OR GET ALONG WITH OTHER PEOPLE: NOT DIFFICULT AT ALL
2. FEELING DOWN, DEPRESSED OR HOPELESS: SEVERAL DAYS

## 2024-02-28 NOTE — PROGRESS NOTES
"Subjective   Patient ID: Stehp Foote is a 23 y.o. female who presents for chest discomfort  (Patient states that when she takes a deep breath she gets a pain of the right side of her chest. Patient states that when she is walking she gets short of breath and will have th breath heavy and it hurts. Onset this past Saturday. ).    Presents today with right sided chest wall pain which she describes as a dull ache. Started Saturday night.  Pain is worse with pressing over the anterior right side of chest wall and with a deep breath.  She had a URI end of Jan and was coughing a lot.  She works out at [employer].  Denies any known injury.     Not a smoker.  No Uri symptoms.  Endorsed one episode of heartburn with coffee. Didn't last.  No recent travel. No leg swelling. No SOB.          Review of Systems  otherwise negative aside from what was mentioned above in HPI.    Objective   /78 (BP Location: Right arm, Patient Position: Sitting)   Pulse 77   Ht 1.727 m (5' 8\")   Wt 81.5 kg (179 lb 9.6 oz)   SpO2 98%   BMI 27.31 kg/m²     Physical Exam  Constitutional:       Appearance: Normal appearance.   Cardiovascular:      Rate and Rhythm: Normal rate and regular rhythm.   Pulmonary:      Effort: Pulmonary effort is normal.      Breath sounds: Normal breath sounds.   Abdominal:      General: Abdomen is flat. Bowel sounds are normal.      Palpations: Abdomen is soft.   Musculoskeletal:         General: Normal range of motion.      Comments: Right sided chest wall tenderness with palpation.   Neurological:      Mental Status: She is alert.   Psychiatric:         Mood and Affect: Mood normal.         Behavior: Behavior normal.         Thought Content: Thought content normal.         Judgment: Judgment normal.         Assessment/Plan   Problem List Items Addressed This Visit    None  Visit Diagnoses         Codes    Costochondritis    -  Primary M94.0          Reassured. Advised Ibuprofen for the next 3 days.     "

## 2024-03-04 ENCOUNTER — TELEPHONE (OUTPATIENT)
Dept: NEUROLOGY | Facility: CLINIC | Age: 24
End: 2024-03-04
Payer: COMMERCIAL

## 2024-03-04 DIAGNOSIS — F90.0 ATTENTION DEFICIT HYPERACTIVITY DISORDER (ADHD), PREDOMINANTLY INATTENTIVE TYPE: ICD-10-CM

## 2024-03-04 DIAGNOSIS — R79.89 ABNORMAL THYROID BLOOD TEST: Primary | ICD-10-CM

## 2024-03-04 RX ORDER — DEXTROAMPHETAMINE SACCHARATE, AMPHETAMINE ASPARTATE MONOHYDRATE, DEXTROAMPHETAMINE SULFATE AND AMPHETAMINE SULFATE 5; 5; 5; 5 MG/1; MG/1; MG/1; MG/1
20 CAPSULE, EXTENDED RELEASE ORAL EVERY MORNING
Qty: 30 CAPSULE | Refills: 0 | Status: SHIPPED | OUTPATIENT
Start: 2024-03-04 | End: 2024-04-01 | Stop reason: SDUPTHER

## 2024-03-28 NOTE — PROGRESS NOTES
"Subjective   Steph Foote is a 23 y.o.   female.  HPI  The patient is being seen today for their encephalopathy r/t their ADHD, anxiety and headaches. They are currently taking long acting Adderall XR 20mg and it has been not effective. The patient can tell when the medication wears off, usually lasts 1 hour and then she \"crashes\". She is only getting 6 or 7 hours of sleep at night. Patient denies any chest pain, heart palpitations, sleep issues, appetite changes, weight loss, and mood changes while taking this medication. She is taking Zoloft for her anxiety and depression and it has been effective with no side effects. Taking Propanolol and Maxalt for migraine. She is averaging between 0-2 migraines per week which are mostly triggered by lack of sleep. Her migraines have been affecting her employment because she is unable to fulfill her work duties due to throbbing head pain, nausea, and vomiting. Neurological exam is normal. I have reviewed the medications and the chart. Review of systems are negative unless otherwise specified in HPI.    Objective   Neurological Exam  Mental Status  Awake, alert and oriented to person, place and time. Speech is normal. Language is fluent with no aphasia. Attention and concentration are normal.    Cranial Nerves  CN III, IV, VI: Pupils equal round and reactive to light bilaterally.  And EOM's Normal.    Motor   Strength is 5/5 throughout all four extremities.    Sensory  Light touch is normal in upper and lower extremities.     Reflexes  Deep tendon reflexes are 2+ and symmetric in all four extremities.    Gait  Casual gait is normal including stance, stride, and arm swing.    Physical Exam  Eyes:      Pupils: Pupils are equal, round, and reactive to light.   Cardiovascular:      Rate and Rhythm: Normal rate.   Neurological:      Motor: Motor strength is normal.     Deep Tendon Reflexes: Reflexes are normal and symmetric.   Psychiatric:         Speech: Speech normal. "       Assessment/Plan   Discussed taking Adderall everyday and not missing doses for 2 weeks. Discussed getting 8 hours of sleep every night. Discussed taking Propanolol 20mg at HS, instead of daytime.   Discussed following up in 3 months. Medication was sent to pharmacy. Discussed with the patient the purpose of medication, as well as potential side effects to be aware of. Informed the patient about abuse potential of medication and the importance adhering to the controlled substance agreement. Advised patient to call office with any adverse reaction to medication.

## 2024-04-01 ENCOUNTER — OFFICE VISIT (OUTPATIENT)
Dept: NEUROLOGY | Facility: CLINIC | Age: 24
End: 2024-04-01
Payer: COMMERCIAL

## 2024-04-01 VITALS
DIASTOLIC BLOOD PRESSURE: 84 MMHG | SYSTOLIC BLOOD PRESSURE: 118 MMHG | WEIGHT: 177.6 LBS | BODY MASS INDEX: 26.92 KG/M2 | HEART RATE: 79 BPM | HEIGHT: 68 IN

## 2024-04-01 DIAGNOSIS — G43.109 MIGRAINE WITH AURA AND WITHOUT STATUS MIGRAINOSUS, NOT INTRACTABLE: ICD-10-CM

## 2024-04-01 DIAGNOSIS — F41.9 ANXIETY: ICD-10-CM

## 2024-04-01 DIAGNOSIS — F90.0 ATTENTION DEFICIT HYPERACTIVITY DISORDER (ADHD), PREDOMINANTLY INATTENTIVE TYPE: ICD-10-CM

## 2024-04-01 DIAGNOSIS — G93.40 ENCEPHALOPATHY: Primary | ICD-10-CM

## 2024-04-01 PROCEDURE — 1036F TOBACCO NON-USER: CPT

## 2024-04-01 PROCEDURE — 99214 OFFICE O/P EST MOD 30 MIN: CPT

## 2024-04-01 RX ORDER — DEXTROAMPHETAMINE SACCHARATE, AMPHETAMINE ASPARTATE MONOHYDRATE, DEXTROAMPHETAMINE SULFATE AND AMPHETAMINE SULFATE 5; 5; 5; 5 MG/1; MG/1; MG/1; MG/1
20 CAPSULE, EXTENDED RELEASE ORAL EVERY MORNING
Qty: 30 CAPSULE | Refills: 0 | Status: SHIPPED | OUTPATIENT
Start: 2024-04-01 | End: 2024-05-02 | Stop reason: SDUPTHER

## 2024-04-01 RX ORDER — PROPRANOLOL HYDROCHLORIDE 20 MG/1
20 TABLET ORAL NIGHTLY
Qty: 1 TABLET | Refills: 0 | Status: SHIPPED | OUTPATIENT
Start: 2024-04-01 | End: 2024-04-04 | Stop reason: SDUPTHER

## 2024-04-01 ASSESSMENT — PATIENT HEALTH QUESTIONNAIRE - PHQ9
1. LITTLE INTEREST OR PLEASURE IN DOING THINGS: NOT AT ALL
2. FEELING DOWN, DEPRESSED OR HOPELESS: NOT AT ALL
SUM OF ALL RESPONSES TO PHQ9 QUESTIONS 1 & 2: 0

## 2024-04-03 ENCOUNTER — TELEPHONE (OUTPATIENT)
Dept: NEUROLOGY | Facility: CLINIC | Age: 24
End: 2024-04-03
Payer: COMMERCIAL

## 2024-04-03 NOTE — TELEPHONE ENCOUNTER
Received fax from pharmacy due to qty for Propranolol 20 mg being only 1. Please verify or fix. Thanks

## 2024-04-04 DIAGNOSIS — G43.109 MIGRAINE WITH AURA AND WITHOUT STATUS MIGRAINOSUS, NOT INTRACTABLE: ICD-10-CM

## 2024-04-04 RX ORDER — PROPRANOLOL HYDROCHLORIDE 20 MG/1
20 TABLET ORAL NIGHTLY
Qty: 30 TABLET | Refills: 0 | Status: SHIPPED | OUTPATIENT
Start: 2024-04-04

## 2024-05-01 ENCOUNTER — TELEPHONE (OUTPATIENT)
Dept: NEUROLOGY | Facility: CLINIC | Age: 24
End: 2024-05-01
Payer: COMMERCIAL

## 2024-05-01 NOTE — TELEPHONE ENCOUNTER
Pt called to get refill on her Adderall XR 20 mg sent to [facility] in [place].  Can you please send the June rx as well?  Next appt is 6/24.  Thanks.

## 2024-05-02 DIAGNOSIS — F90.0 ATTENTION DEFICIT HYPERACTIVITY DISORDER (ADHD), PREDOMINANTLY INATTENTIVE TYPE: ICD-10-CM

## 2024-05-02 RX ORDER — DEXTROAMPHETAMINE SACCHARATE, AMPHETAMINE ASPARTATE MONOHYDRATE, DEXTROAMPHETAMINE SULFATE AND AMPHETAMINE SULFATE 5; 5; 5; 5 MG/1; MG/1; MG/1; MG/1
20 CAPSULE, EXTENDED RELEASE ORAL EVERY MORNING
Qty: 30 CAPSULE | Refills: 0 | Status: SHIPPED | OUTPATIENT
Start: 2024-05-02

## 2024-05-02 RX ORDER — DEXTROAMPHETAMINE SACCHARATE, AMPHETAMINE ASPARTATE MONOHYDRATE, DEXTROAMPHETAMINE SULFATE AND AMPHETAMINE SULFATE 5; 5; 5; 5 MG/1; MG/1; MG/1; MG/1
20 CAPSULE, EXTENDED RELEASE ORAL EVERY MORNING
Qty: 30 CAPSULE | Refills: 0 | Status: SHIPPED | OUTPATIENT
Start: 2024-06-01 | End: 2024-07-01

## 2024-05-31 DIAGNOSIS — Z00.00 HEALTHCARE MAINTENANCE: ICD-10-CM

## 2024-05-31 DIAGNOSIS — L70.9 ACNE, UNSPECIFIED ACNE TYPE: ICD-10-CM

## 2024-05-31 RX ORDER — DROSPIRENONE AND ETHINYL ESTRADIOL 0.03MG-3MG
1 KIT ORAL DAILY
Qty: 84 TABLET | Refills: 0 | Status: SHIPPED | OUTPATIENT
Start: 2024-05-31

## 2024-06-24 ENCOUNTER — APPOINTMENT (OUTPATIENT)
Dept: NEUROLOGY | Facility: CLINIC | Age: 24
End: 2024-06-24
Payer: COMMERCIAL

## 2024-06-27 NOTE — PROGRESS NOTES
Subjective   Steph Foote is a 23 y.o.   female.  HPI  The patient is being seen today for their encephalopathy r/t their ADHD, migraine, and anxiety. They are currently taking Adderall XR 20mg and it has been somewhat effective. The patient can tell when the medication wears off, not lasting long enough for school work. The patient agrees that their quality of life has improved while taking this medication. Patient denies any chest pain, heart palpitations, sleep issues, appetite changes, weight loss, and mood changes while taking this medication. She is taking Zoloft for her anxiety and depression and it has been somewhat effective with no side effects, but she feels as though it is not working as well. Having some increased social anxiety. Taking Propanolol and Maxalt for migraine. She is averaging between 2 migraines per week which are mostly triggered by lack of sleep. Neurological exam is normal. I have reviewed the medications and the chart. Review of systems are negative unless otherwise specified in HPI.    Objective   Neurological Exam  Mental Status  Awake, alert and oriented to person, place and time. Speech is normal. Language is fluent with no aphasia. Attention and concentration are normal.    Cranial Nerves  CN III, IV, VI: Pupils equal round and reactive to light bilaterally.  And EOM's Normal.    Motor   Strength is 5/5 throughout all four extremities.    Sensory  Light touch is normal in upper and lower extremities.     Reflexes  Deep tendon reflexes are 2+ and symmetric in all four extremities.    Gait  Casual gait is normal including stance, stride, and arm swing.Normal toe walking. Normal heel walking.    Physical Exam  Eyes:      Pupils: Pupils are equal, round, and reactive to light.   Neurological:      Motor: Motor strength is normal.     Deep Tendon Reflexes: Reflexes are normal and symmetric.   Psychiatric:         Speech: Speech normal.           Assessment/Plan   Will try a short  acting in afternoon and see how she feels. Will titrate off of Zoloft 75mg daily x 1 week, 50mg daily x 1 week, 25mg daily x 1 week. During 25mg week, may start Prozac 20mg daily.   Discussed following up in 3 months. Medication was sent to pharmacy. Discussed with the patient the purpose of medication, as well as potential side effects to be aware of. Informed the patient about abuse potential of medication and the importance adhering to the controlled substance agreement. Advised patient to call office with any adverse reaction to medication.

## 2024-07-01 ENCOUNTER — APPOINTMENT (OUTPATIENT)
Dept: NEUROLOGY | Facility: CLINIC | Age: 24
End: 2024-07-01
Payer: COMMERCIAL

## 2024-07-01 VITALS
HEART RATE: 85 BPM | DIASTOLIC BLOOD PRESSURE: 68 MMHG | HEIGHT: 68 IN | BODY MASS INDEX: 27.34 KG/M2 | SYSTOLIC BLOOD PRESSURE: 118 MMHG | WEIGHT: 180.4 LBS

## 2024-07-01 DIAGNOSIS — F41.9 ANXIETY: ICD-10-CM

## 2024-07-01 DIAGNOSIS — G93.40 ENCEPHALOPATHY: Primary | ICD-10-CM

## 2024-07-01 DIAGNOSIS — F90.0 ATTENTION DEFICIT HYPERACTIVITY DISORDER (ADHD), PREDOMINANTLY INATTENTIVE TYPE: ICD-10-CM

## 2024-07-01 DIAGNOSIS — G43.109 MIGRAINE WITH AURA AND WITHOUT STATUS MIGRAINOSUS, NOT INTRACTABLE: ICD-10-CM

## 2024-07-01 PROCEDURE — 99214 OFFICE O/P EST MOD 30 MIN: CPT

## 2024-07-01 PROCEDURE — 1036F TOBACCO NON-USER: CPT

## 2024-07-01 RX ORDER — DEXTROAMPHETAMINE SACCHARATE, AMPHETAMINE ASPARTATE MONOHYDRATE, DEXTROAMPHETAMINE SULFATE AND AMPHETAMINE SULFATE 5; 5; 5; 5 MG/1; MG/1; MG/1; MG/1
20 CAPSULE, EXTENDED RELEASE ORAL EVERY MORNING
Qty: 30 CAPSULE | Refills: 0 | Status: SHIPPED | OUTPATIENT
Start: 2024-09-01

## 2024-07-01 RX ORDER — DEXTROAMPHETAMINE SACCHARATE, AMPHETAMINE ASPARTATE MONOHYDRATE, DEXTROAMPHETAMINE SULFATE AND AMPHETAMINE SULFATE 5; 5; 5; 5 MG/1; MG/1; MG/1; MG/1
20 CAPSULE, EXTENDED RELEASE ORAL EVERY MORNING
Qty: 30 CAPSULE | Refills: 0 | Status: SHIPPED | OUTPATIENT
Start: 2024-07-01 | End: 2024-07-31

## 2024-07-01 RX ORDER — DEXTROAMPHETAMINE SACCHARATE, AMPHETAMINE ASPARTATE MONOHYDRATE, DEXTROAMPHETAMINE SULFATE AND AMPHETAMINE SULFATE 5; 5; 5; 5 MG/1; MG/1; MG/1; MG/1
20 CAPSULE, EXTENDED RELEASE ORAL EVERY MORNING
Qty: 30 CAPSULE | Refills: 0 | Status: SHIPPED | OUTPATIENT
Start: 2024-08-01 | End: 2024-08-31

## 2024-07-01 RX ORDER — RIZATRIPTAN BENZOATE 5 MG/1
5 TABLET ORAL DAILY PRN
Qty: 9 TABLET | Refills: 3 | Status: SHIPPED | OUTPATIENT
Start: 2024-07-01

## 2024-07-01 RX ORDER — DEXTROAMPHETAMINE SACCHARATE, AMPHETAMINE ASPARTATE, DEXTROAMPHETAMINE SULFATE AND AMPHETAMINE SULFATE 2.5; 2.5; 2.5; 2.5 MG/1; MG/1; MG/1; MG/1
10 TABLET ORAL DAILY
Qty: 30 TABLET | Refills: 0 | Status: SHIPPED | OUTPATIENT
Start: 2024-07-01 | End: 2024-07-31

## 2024-07-01 RX ORDER — FLUOXETINE HYDROCHLORIDE 40 MG/1
40 CAPSULE ORAL DAILY
Qty: 30 CAPSULE | Refills: 3 | Status: SHIPPED | OUTPATIENT
Start: 2024-07-01 | End: 2025-07-01

## 2024-09-11 ENCOUNTER — PATIENT MESSAGE (OUTPATIENT)
Dept: NEUROLOGY | Facility: CLINIC | Age: 24
End: 2024-09-11
Payer: COMMERCIAL

## 2024-09-12 DIAGNOSIS — F90.0 ATTENTION DEFICIT HYPERACTIVITY DISORDER (ADHD), PREDOMINANTLY INATTENTIVE TYPE: ICD-10-CM

## 2024-09-12 RX ORDER — DEXTROAMPHETAMINE SACCHARATE, AMPHETAMINE ASPARTATE, DEXTROAMPHETAMINE SULFATE AND AMPHETAMINE SULFATE 2.5; 2.5; 2.5; 2.5 MG/1; MG/1; MG/1; MG/1
10 TABLET ORAL 2 TIMES DAILY
Qty: 60 TABLET | Refills: 0 | Status: SHIPPED | OUTPATIENT
Start: 2024-09-12 | End: 2024-10-12

## 2024-09-13 ENCOUNTER — APPOINTMENT (OUTPATIENT)
Dept: PRIMARY CARE | Facility: CLINIC | Age: 24
End: 2024-09-13
Payer: COMMERCIAL

## 2024-09-13 ENCOUNTER — LAB (OUTPATIENT)
Dept: LAB | Facility: LAB | Age: 24
End: 2024-09-13
Payer: COMMERCIAL

## 2024-09-13 VITALS
DIASTOLIC BLOOD PRESSURE: 79 MMHG | HEART RATE: 80 BPM | HEIGHT: 68 IN | BODY MASS INDEX: 26.46 KG/M2 | OXYGEN SATURATION: 98 % | WEIGHT: 174.6 LBS | SYSTOLIC BLOOD PRESSURE: 119 MMHG | TEMPERATURE: 98.7 F

## 2024-09-13 DIAGNOSIS — F41.9 ANXIETY: ICD-10-CM

## 2024-09-13 DIAGNOSIS — E06.3 HASHIMOTO'S THYROIDITIS: ICD-10-CM

## 2024-09-13 DIAGNOSIS — Z00.00 ANNUAL PHYSICAL EXAM: ICD-10-CM

## 2024-09-13 DIAGNOSIS — Z00.00 HEALTHCARE MAINTENANCE: ICD-10-CM

## 2024-09-13 DIAGNOSIS — N63.12 MASS OF UPPER INNER QUADRANT OF RIGHT BREAST: ICD-10-CM

## 2024-09-13 DIAGNOSIS — L70.9 ACNE, UNSPECIFIED ACNE TYPE: ICD-10-CM

## 2024-09-13 DIAGNOSIS — Z00.00 ANNUAL PHYSICAL EXAM: Primary | ICD-10-CM

## 2024-09-13 PROBLEM — N63.10 MASS OF RIGHT BREAST: Status: ACTIVE | Noted: 2024-09-13

## 2024-09-13 PROCEDURE — 36415 COLL VENOUS BLD VENIPUNCTURE: CPT

## 2024-09-13 PROCEDURE — 80053 COMPREHEN METABOLIC PANEL: CPT

## 2024-09-13 PROCEDURE — 85027 COMPLETE CBC AUTOMATED: CPT

## 2024-09-13 PROCEDURE — 3008F BODY MASS INDEX DOCD: CPT | Performed by: INTERNAL MEDICINE

## 2024-09-13 PROCEDURE — 99395 PREV VISIT EST AGE 18-39: CPT | Performed by: INTERNAL MEDICINE

## 2024-09-13 PROCEDURE — 86376 MICROSOMAL ANTIBODY EACH: CPT

## 2024-09-13 PROCEDURE — 84443 ASSAY THYROID STIM HORMONE: CPT

## 2024-09-13 RX ORDER — DROSPIRENONE AND ETHINYL ESTRADIOL 0.03MG-3MG
1 KIT ORAL DAILY
Qty: 84 TABLET | Refills: 3 | Status: SHIPPED | OUTPATIENT
Start: 2024-09-13

## 2024-09-13 ASSESSMENT — PATIENT HEALTH QUESTIONNAIRE - PHQ9
2. FEELING DOWN, DEPRESSED OR HOPELESS: NOT AT ALL
SUM OF ALL RESPONSES TO PHQ9 QUESTIONS 1 AND 2: 0
1. LITTLE INTEREST OR PLEASURE IN DOING THINGS: NOT AT ALL

## 2024-09-14 LAB
ALBUMIN SERPL BCP-MCNC: 4.5 G/DL (ref 3.4–5)
ALP SERPL-CCNC: 46 U/L (ref 33–110)
ALT SERPL W P-5'-P-CCNC: 10 U/L (ref 7–45)
ANION GAP SERPL CALC-SCNC: 11 MMOL/L (ref 10–20)
AST SERPL W P-5'-P-CCNC: 13 U/L (ref 9–39)
BILIRUB SERPL-MCNC: 0.6 MG/DL (ref 0–1.2)
BUN SERPL-MCNC: 12 MG/DL (ref 6–23)
CALCIUM SERPL-MCNC: 9.2 MG/DL (ref 8.6–10.6)
CHLORIDE SERPL-SCNC: 104 MMOL/L (ref 98–107)
CO2 SERPL-SCNC: 27 MMOL/L (ref 21–32)
CREAT SERPL-MCNC: 0.68 MG/DL (ref 0.5–1.05)
EGFRCR SERPLBLD CKD-EPI 2021: >90 ML/MIN/1.73M*2
ERYTHROCYTE [DISTWIDTH] IN BLOOD BY AUTOMATED COUNT: 12.7 % (ref 11.5–14.5)
GLUCOSE SERPL-MCNC: 81 MG/DL (ref 74–99)
HCT VFR BLD AUTO: 39.1 % (ref 36–46)
HGB BLD-MCNC: 12.5 G/DL (ref 12–16)
MCH RBC QN AUTO: 29.7 PG (ref 26–34)
MCHC RBC AUTO-ENTMCNC: 32 G/DL (ref 32–36)
MCV RBC AUTO: 93 FL (ref 80–100)
NRBC BLD-RTO: 0 /100 WBCS (ref 0–0)
PLATELET # BLD AUTO: 247 X10*3/UL (ref 150–450)
POTASSIUM SERPL-SCNC: 4.3 MMOL/L (ref 3.5–5.3)
PROT SERPL-MCNC: 7 G/DL (ref 6.4–8.2)
RBC # BLD AUTO: 4.21 X10*6/UL (ref 4–5.2)
SODIUM SERPL-SCNC: 138 MMOL/L (ref 136–145)
THYROPEROXIDASE AB SERPL-ACNC: >1000 IU/ML
TSH SERPL-ACNC: 1.76 MIU/L (ref 0.44–3.98)
WBC # BLD AUTO: 7.8 X10*3/UL (ref 4.4–11.3)

## 2024-09-14 ASSESSMENT — ENCOUNTER SYMPTOMS
MUSCULOSKELETAL NEGATIVE: 1
NERVOUS/ANXIOUS: 1
NEUROLOGICAL NEGATIVE: 1
FATIGUE: 1
ENDOCRINE COMMENTS: AS HPI
RESPIRATORY NEGATIVE: 1
CARDIOVASCULAR NEGATIVE: 1
HEMATOLOGIC/LYMPHATIC NEGATIVE: 1
EYES NEGATIVE: 1
GASTROINTESTINAL NEGATIVE: 1

## 2024-09-15 NOTE — ASSESSMENT & PLAN NOTE
Will order diagnostic right mammogram with ultrasound for further evaluation of patient's symptoms.

## 2024-09-15 NOTE — PROGRESS NOTES
"Subjective   Patient ID: Steph Foote is a 23 y.o. female who presents for Annual Exam (Patient is here for an annual physical exam. ) and Mass (Patient is here for a lump on the right breast. ).    Patient seen up accompanied by her mother for physical, she also noted some pain in the right breast few days ago they noted a lump according to patient is getting smaller, she does not recall history of trauma or hitting her chest.  She also has history of thyroid nodule, seen by endocrinologist  last year, she still have same symptoms of fatigue inability to lose weight, she gained 13 pounds in 1 year but has been stable.  He see neurologist for ADHD         Review of Systems   Constitutional:  Positive for fatigue.   HENT: Negative.     Eyes: Negative.    Respiratory: Negative.     Cardiovascular: Negative.    Gastrointestinal: Negative.    Endocrine:        As HPI   Genitourinary: Negative.         As HPI   Musculoskeletal: Negative.    Neurological: Negative.    Hematological: Negative.    Psychiatric/Behavioral:  The patient is nervous/anxious.        Objective   /79 (BP Location: Left arm, Patient Position: Sitting, BP Cuff Size: Adult)   Pulse 80   Temp 37.1 °C (98.7 °F) (Temporal)   Ht 1.715 m (5' 7.5\")   Wt 79.2 kg (174 lb 9.6 oz)   SpO2 98%   BMI 26.94 kg/m²     Physical Exam  Vitals reviewed.   Constitutional:       Appearance: Normal appearance.   HENT:      Head: Normocephalic and atraumatic.      Right Ear: Tympanic membrane and ear canal normal.      Left Ear: Tympanic membrane and ear canal normal.      Mouth/Throat:      Pharynx: No oropharyngeal exudate or posterior oropharyngeal erythema.   Eyes:      Extraocular Movements: Extraocular movements intact.      Pupils: Pupils are equal, round, and reactive to light.   Neck:      Vascular: No carotid bruit.   Cardiovascular:      Rate and Rhythm: Normal rate and regular rhythm.      Pulses: Normal pulses.      Heart sounds: " Normal heart sounds. No murmur heard.  Pulmonary:      Effort: Pulmonary effort is normal.      Breath sounds: Normal breath sounds.   Chest:   Breasts:     Right: No inverted nipple, skin change or tenderness.      Left: No inverted nipple, skin change or tenderness.          Comments: I did palpate the area of concern in the right breast upper inner quadrant did not feel any dominant mass.  No ecchymosis seen.  There was a small ecchymosis in the right scapular area, nontender to palpation.  Abdominal:      General: Abdomen is flat. Bowel sounds are normal.      Palpations: Abdomen is soft.      Tenderness: There is no right CVA tenderness or left CVA tenderness.   Musculoskeletal:         General: Normal range of motion.      Cervical back: Normal range of motion and neck supple.      Right lower leg: No edema.      Left lower leg: No edema.   Lymphadenopathy:      Cervical: No cervical adenopathy.      Upper Body:      Right upper body: No supraclavicular or axillary adenopathy.      Left upper body: No supraclavicular or axillary adenopathy.   Neurological:      General: No focal deficit present.      Mental Status: She is alert and oriented to person, place, and time.   Psychiatric:         Mood and Affect: Mood normal.         Assessment/Plan   Problem List Items Addressed This Visit             ICD-10-CM    Anxiety F41.9     On fluoxetine         Acne L70.9    Relevant Medications    drospirenone-ethinyl estradioL (Smita, Ocella) 3-0.03 mg tablet    Hashimoto's thyroiditis E06.3    Relevant Orders    Thyroid Peroxidase (TPO) Antibody (Completed)    Referral to Endocrinology    Annual physical exam - Primary Z00.00     Complete physical examination completed today.  Advised to keep a heart healthy, low-fat diet as recommended diet is the Mediterranean diet.  Advised to exercise regularly for 30 minutes daily 5 days a week and maintain 150 minutes of exercise per week.  Discussed age-appropriate cancer  screening,Immunization and recommendation were given.  Advised on regular eye and dental visit.  Advised on staying well-hydrated.         Relevant Orders    CBC (Completed)    Comprehensive Metabolic Panel (Completed)    TSH with reflex to Free T4 if abnormal (Completed)    Mass of upper inner quadrant of right breast N63.12     Will order diagnostic right mammogram with ultrasound for further evaluation of patient's symptoms.         Relevant Orders    BI mammo right diagnostic tomosynthesis    BI US breast complete right     Other Visit Diagnoses         Codes    Healthcare maintenance     Z00.00    Relevant Medications    drospirenone-ethinyl estradioL (Smita, Ocella) 3-0.03 mg tablet

## 2024-09-18 NOTE — PROGRESS NOTES
Subjective   Steph Foote is a 23 y.o.   female.  HPI  The patient is being seen today for their encephalopathy r/t their ADHD, anxiety, and migraine. They are currently taking Adderall IR 10mg BID due to the XR shortage and it has been effective. The patient can tell when the medication wears off. The patient agrees that their quality of life has improved while taking this medication. Patient denies any chest pain, heart palpitations, sleep issues, appetite changes, weight loss, and mood changes while taking this medication. She is taking Prozac 40mg started at last appt. for her anxiety and depression and it has been effective with no side effects. It has been effective for premenstrual depression, not so much the social anxiety. Taking Maxalt as needed for migraine. She is averaging between 1-2 migraines per MONTH which are mostly triggered by lack of sleep. The frequency of her migraines has decreased significantly lately.    Neurological exam is normal. I have reviewed the medications and the chart. Review of systems are negative unless otherwise specified in HPI.     I have personally reviewed the [facility] report for this patient. I have considered the risks of abuse, dependence, addiction, and diversion. I believe that it is critically appropriate for this patient to be prescribed this medication based on documented diagnosis of ADHD. An updated contract between the patient and myself was signed, scanned into the EMR, and the patient agrees to the terms. Any deviation from this agreement will result in termination from my practice. CSA was signed and dated. UDS obtained, results are pending.     Objective   Neurological Exam  Mental Status  Awake, alert and oriented to person, place and time. Speech is normal. Language is fluent with no aphasia. Attention and concentration are normal.    Cranial Nerves  CN III, IV, VI: Pupils equal round and reactive to light bilaterally.  And EOM's Normal.    Motor    Strength is 5/5 throughout all four extremities.    Sensory  Light touch is normal in upper and lower extremities.     Reflexes  Deep tendon reflexes are 2+ and symmetric in all four extremities.    Gait  Casual gait is normal including stance, stride, and arm swing.Normal toe walking. Normal heel walking.    Physical Exam  Eyes:      Pupils: Pupils are equal, round, and reactive to light.   Neurological:      Motor: Motor strength is normal.     Deep Tendon Reflexes: Reflexes are normal and symmetric.   Psychiatric:         Speech: Speech normal.           Assessment/Plan   Discussed following up in 3 months. Medication was sent to pharmacy. Discussed with the patient the purpose of medication, as well as potential side effects to be aware of. Informed the patient about abuse potential of medication and the importance adhering to the controlled substance agreement. Advised patient to call office with any adverse reaction to medication.

## 2024-09-23 ENCOUNTER — APPOINTMENT (OUTPATIENT)
Dept: NEUROLOGY | Facility: CLINIC | Age: 24
End: 2024-09-23
Payer: COMMERCIAL

## 2024-09-23 VITALS
HEIGHT: 68 IN | BODY MASS INDEX: 26.58 KG/M2 | SYSTOLIC BLOOD PRESSURE: 126 MMHG | DIASTOLIC BLOOD PRESSURE: 84 MMHG | HEART RATE: 94 BPM | WEIGHT: 175.4 LBS

## 2024-09-23 DIAGNOSIS — G93.40 ENCEPHALOPATHY: Primary | ICD-10-CM

## 2024-09-23 DIAGNOSIS — F41.9 ANXIETY: ICD-10-CM

## 2024-09-23 DIAGNOSIS — F90.0 ATTENTION DEFICIT HYPERACTIVITY DISORDER (ADHD), PREDOMINANTLY INATTENTIVE TYPE: ICD-10-CM

## 2024-09-23 LAB
AMPHETAMINES UR QL SCN: ABNORMAL
BARBITURATES UR QL SCN: ABNORMAL
BENZODIAZ UR QL SCN: ABNORMAL
BZE UR QL SCN: ABNORMAL
CANNABINOIDS UR QL SCN: ABNORMAL
FENTANYL+NORFENTANYL UR QL SCN: ABNORMAL
METHADONE UR QL SCN: ABNORMAL
OPIATES UR QL SCN: ABNORMAL
OXYCODONE+OXYMORPHONE UR QL SCN: ABNORMAL
PCP UR QL SCN: ABNORMAL

## 2024-09-23 PROCEDURE — 3008F BODY MASS INDEX DOCD: CPT

## 2024-09-23 PROCEDURE — 1036F TOBACCO NON-USER: CPT

## 2024-09-23 PROCEDURE — 99214 OFFICE O/P EST MOD 30 MIN: CPT

## 2024-09-23 RX ORDER — DEXTROAMPHETAMINE SACCHARATE, AMPHETAMINE ASPARTATE, DEXTROAMPHETAMINE SULFATE AND AMPHETAMINE SULFATE 2.5; 2.5; 2.5; 2.5 MG/1; MG/1; MG/1; MG/1
10 TABLET ORAL 2 TIMES DAILY
Qty: 60 TABLET | Refills: 0 | Status: SHIPPED | OUTPATIENT
Start: 2024-11-12 | End: 2024-12-12

## 2024-09-23 RX ORDER — DEXTROAMPHETAMINE SACCHARATE, AMPHETAMINE ASPARTATE, DEXTROAMPHETAMINE SULFATE AND AMPHETAMINE SULFATE 2.5; 2.5; 2.5; 2.5 MG/1; MG/1; MG/1; MG/1
10 TABLET ORAL 2 TIMES DAILY
Qty: 60 TABLET | Refills: 0 | Status: SHIPPED | OUTPATIENT
Start: 2024-09-23 | End: 2024-10-23

## 2024-09-23 RX ORDER — DEXTROAMPHETAMINE SACCHARATE, AMPHETAMINE ASPARTATE, DEXTROAMPHETAMINE SULFATE AND AMPHETAMINE SULFATE 2.5; 2.5; 2.5; 2.5 MG/1; MG/1; MG/1; MG/1
10 TABLET ORAL 2 TIMES DAILY
Qty: 60 TABLET | Refills: 0 | Status: SHIPPED | OUTPATIENT
Start: 2024-10-22 | End: 2024-09-23

## 2024-09-23 RX ORDER — DEXTROAMPHETAMINE SACCHARATE, AMPHETAMINE ASPARTATE, DEXTROAMPHETAMINE SULFATE AND AMPHETAMINE SULFATE 2.5; 2.5; 2.5; 2.5 MG/1; MG/1; MG/1; MG/1
10 TABLET ORAL 2 TIMES DAILY
Qty: 60 TABLET | Refills: 0 | Status: SHIPPED | OUTPATIENT
Start: 2024-10-12 | End: 2024-11-11

## 2024-09-23 RX ORDER — DEXTROAMPHETAMINE SACCHARATE, AMPHETAMINE ASPARTATE, DEXTROAMPHETAMINE SULFATE AND AMPHETAMINE SULFATE 2.5; 2.5; 2.5; 2.5 MG/1; MG/1; MG/1; MG/1
10 TABLET ORAL 2 TIMES DAILY
Qty: 60 TABLET | Refills: 0 | Status: SHIPPED | OUTPATIENT
Start: 2024-11-22 | End: 2024-09-23

## 2024-09-24 ENCOUNTER — APPOINTMENT (OUTPATIENT)
Dept: RADIOLOGY | Facility: CLINIC | Age: 24
End: 2024-09-24
Payer: COMMERCIAL

## 2024-10-15 ENCOUNTER — APPOINTMENT (OUTPATIENT)
Dept: ENDOCRINOLOGY | Facility: CLINIC | Age: 24
End: 2024-10-15
Payer: COMMERCIAL

## 2024-10-17 ENCOUNTER — APPOINTMENT (OUTPATIENT)
Dept: RADIOLOGY | Facility: HOSPITAL | Age: 24
End: 2024-10-17
Payer: COMMERCIAL

## 2024-10-28 ENCOUNTER — PATIENT MESSAGE (OUTPATIENT)
Dept: NEUROLOGY | Facility: CLINIC | Age: 24
End: 2024-10-28
Payer: COMMERCIAL

## 2024-10-29 DIAGNOSIS — F90.0 ATTENTION DEFICIT HYPERACTIVITY DISORDER (ADHD), PREDOMINANTLY INATTENTIVE TYPE: ICD-10-CM

## 2024-10-29 RX ORDER — DEXTROAMPHETAMINE SACCHARATE, AMPHETAMINE ASPARTATE, DEXTROAMPHETAMINE SULFATE AND AMPHETAMINE SULFATE 2.5; 2.5; 2.5; 2.5 MG/1; MG/1; MG/1; MG/1
10 TABLET ORAL 3 TIMES DAILY
Qty: 90 TABLET | Refills: 0 | Status: SHIPPED | OUTPATIENT
Start: 2024-10-29 | End: 2024-11-28

## 2024-10-29 RX ORDER — DEXTROAMPHETAMINE SACCHARATE, AMPHETAMINE ASPARTATE, DEXTROAMPHETAMINE SULFATE AND AMPHETAMINE SULFATE 2.5; 2.5; 2.5; 2.5 MG/1; MG/1; MG/1; MG/1
10 TABLET ORAL 3 TIMES DAILY
Qty: 90 TABLET | Refills: 0 | Status: SHIPPED | OUTPATIENT
Start: 2024-11-28 | End: 2024-12-28

## 2024-11-18 DIAGNOSIS — G43.109 MIGRAINE WITH AURA AND WITHOUT STATUS MIGRAINOSUS, NOT INTRACTABLE: ICD-10-CM

## 2024-11-18 RX ORDER — RIZATRIPTAN BENZOATE 5 MG/1
TABLET ORAL
Qty: 9 TABLET | Refills: 0 | Status: SHIPPED | OUTPATIENT
Start: 2024-11-18

## 2024-11-19 RX ORDER — RIZATRIPTAN BENZOATE 5 MG/1
TABLET ORAL
Qty: 9 TABLET | Refills: 0 | Status: SHIPPED | OUTPATIENT
Start: 2024-11-19

## 2024-12-19 ENCOUNTER — APPOINTMENT (OUTPATIENT)
Dept: NEUROLOGY | Facility: CLINIC | Age: 24
End: 2024-12-19
Payer: COMMERCIAL

## 2024-12-23 DIAGNOSIS — F41.9 ANXIETY: ICD-10-CM

## 2024-12-23 RX ORDER — FLUOXETINE HYDROCHLORIDE 40 MG/1
40 CAPSULE ORAL DAILY
Qty: 30 CAPSULE | Refills: 0 | OUTPATIENT
Start: 2024-12-23

## 2024-12-23 NOTE — TELEPHONE ENCOUNTER
Needs appt for refills.  Pt cx'd 12/19 appt, a vm had been left for pt to call office to reschedule.

## 2024-12-29 DIAGNOSIS — G43.109 MIGRAINE WITH AURA AND WITHOUT STATUS MIGRAINOSUS, NOT INTRACTABLE: ICD-10-CM

## 2024-12-30 RX ORDER — RIZATRIPTAN BENZOATE 5 MG/1
TABLET ORAL
Qty: 9 TABLET | Refills: 0 | Status: SHIPPED | OUTPATIENT
Start: 2024-12-30

## 2025-02-15 DIAGNOSIS — G43.109 MIGRAINE WITH AURA AND WITHOUT STATUS MIGRAINOSUS, NOT INTRACTABLE: ICD-10-CM

## 2025-02-16 RX ORDER — RIZATRIPTAN BENZOATE 5 MG/1
TABLET ORAL
Qty: 9 TABLET | Refills: 0 | Status: SHIPPED | OUTPATIENT
Start: 2025-02-16

## 2025-03-27 DIAGNOSIS — G43.109 MIGRAINE WITH AURA AND WITHOUT STATUS MIGRAINOSUS, NOT INTRACTABLE: ICD-10-CM

## 2025-03-27 RX ORDER — RIZATRIPTAN BENZOATE 5 MG/1
TABLET ORAL
Qty: 9 TABLET | Refills: 0 | Status: SHIPPED | OUTPATIENT
Start: 2025-03-27

## 2025-04-25 DIAGNOSIS — G43.109 MIGRAINE WITH AURA AND WITHOUT STATUS MIGRAINOSUS, NOT INTRACTABLE: ICD-10-CM

## 2025-04-26 DIAGNOSIS — G43.109 MIGRAINE WITH AURA AND WITHOUT STATUS MIGRAINOSUS, NOT INTRACTABLE: ICD-10-CM

## 2025-04-27 RX ORDER — RIZATRIPTAN BENZOATE 5 MG/1
TABLET ORAL
Qty: 9 TABLET | Refills: 0 | Status: SHIPPED | OUTPATIENT
Start: 2025-04-27

## 2025-04-28 RX ORDER — RIZATRIPTAN BENZOATE 5 MG/1
TABLET ORAL
Qty: 9 TABLET | Refills: 0 | Status: SHIPPED | OUTPATIENT
Start: 2025-04-28

## 2025-06-06 DIAGNOSIS — G43.109 MIGRAINE WITH AURA AND WITHOUT STATUS MIGRAINOSUS, NOT INTRACTABLE: ICD-10-CM

## 2025-06-06 RX ORDER — RIZATRIPTAN BENZOATE 5 MG/1
TABLET ORAL
Qty: 9 TABLET | Refills: 0 | Status: SHIPPED | OUTPATIENT
Start: 2025-06-06